# Patient Record
Sex: MALE | Race: OTHER | Employment: OTHER | URBAN - METROPOLITAN AREA
[De-identification: names, ages, dates, MRNs, and addresses within clinical notes are randomized per-mention and may not be internally consistent; named-entity substitution may affect disease eponyms.]

---

## 2017-04-25 ENCOUNTER — OFFICE VISIT (OUTPATIENT)
Dept: FAMILY MEDICINE CLINIC | Facility: CLINIC | Age: 62
End: 2017-04-25

## 2017-04-25 VITALS
DIASTOLIC BLOOD PRESSURE: 63 MMHG | SYSTOLIC BLOOD PRESSURE: 103 MMHG | TEMPERATURE: 98 F | BODY MASS INDEX: 26.98 KG/M2 | HEIGHT: 64 IN | HEART RATE: 64 BPM | WEIGHT: 158 LBS

## 2017-04-25 DIAGNOSIS — R21 RASH AND NONSPECIFIC SKIN ERUPTION: Primary | ICD-10-CM

## 2017-04-25 PROCEDURE — 99213 OFFICE O/P EST LOW 20 MIN: CPT | Performed by: FAMILY MEDICINE

## 2017-04-25 PROCEDURE — 99212 OFFICE O/P EST SF 10 MIN: CPT | Performed by: FAMILY MEDICINE

## 2017-04-25 RX ORDER — CEPHALEXIN 500 MG/1
500 CAPSULE ORAL 3 TIMES DAILY
Qty: 21 CAPSULE | Refills: 0 | Status: SHIPPED | OUTPATIENT
Start: 2017-04-25 | End: 2017-05-02

## 2017-04-25 NOTE — PROGRESS NOTES
HPI:    Patient ID: Gui Wilson is a [address] old male. Rash  This is a new problem. Episode onset: 2 weeks. The problem has been gradually worsening since onset. The affected locations include the left lower leg.  The rash is characterized by itchiness

## 2017-11-06 ENCOUNTER — OFFICE VISIT (OUTPATIENT)
Dept: FAMILY MEDICINE CLINIC | Facility: CLINIC | Age: 62
End: 2017-11-06

## 2017-11-06 VITALS
BODY MASS INDEX: 26.63 KG/M2 | HEIGHT: 64 IN | WEIGHT: 156 LBS | SYSTOLIC BLOOD PRESSURE: 123 MMHG | TEMPERATURE: 98 F | HEART RATE: 69 BPM | DIASTOLIC BLOOD PRESSURE: 83 MMHG

## 2017-11-06 DIAGNOSIS — L03.113 CELLULITIS OF HAND, RIGHT: ICD-10-CM

## 2017-11-06 DIAGNOSIS — L30.8 OTHER ECZEMA: Primary | ICD-10-CM

## 2017-11-06 PROCEDURE — 99214 OFFICE O/P EST MOD 30 MIN: CPT | Performed by: FAMILY MEDICINE

## 2017-11-06 PROCEDURE — 99212 OFFICE O/P EST SF 10 MIN: CPT | Performed by: FAMILY MEDICINE

## 2017-11-06 RX ORDER — PREDNISONE 20 MG/1
20 TABLET ORAL DAILY
Qty: 7 TABLET | Refills: 0 | Status: SHIPPED | OUTPATIENT
Start: 2017-11-06 | End: 2017-11-13

## 2017-11-06 RX ORDER — CEPHALEXIN 500 MG/1
500 CAPSULE ORAL 3 TIMES DAILY
Qty: 30 CAPSULE | Refills: 0 | Status: SHIPPED | OUTPATIENT
Start: 2017-11-06 | End: 2017-11-16

## 2017-11-16 ENCOUNTER — OFFICE VISIT (OUTPATIENT)
Dept: FAMILY MEDICINE CLINIC | Facility: CLINIC | Age: 62
End: 2017-11-16

## 2017-11-16 VITALS
BODY MASS INDEX: 26.98 KG/M2 | HEART RATE: 71 BPM | WEIGHT: 158 LBS | HEIGHT: 64 IN | DIASTOLIC BLOOD PRESSURE: 84 MMHG | SYSTOLIC BLOOD PRESSURE: 128 MMHG

## 2017-11-16 DIAGNOSIS — L03.113 CELLULITIS OF RIGHT UPPER EXTREMITY: Primary | ICD-10-CM

## 2017-11-16 DIAGNOSIS — Z00.00 WELL ADULT EXAM: ICD-10-CM

## 2017-11-16 DIAGNOSIS — Z12.11 SCREEN FOR COLON CANCER: ICD-10-CM

## 2017-11-16 PROCEDURE — 99213 OFFICE O/P EST LOW 20 MIN: CPT | Performed by: NURSE PRACTITIONER

## 2017-11-16 NOTE — PATIENT INSTRUCTIONS
Triple antibiotic ointment to hand-keep covered when working; call if increase in redness, pain or drainage      LAB PREPARATION    -nothing to eat or drink for 12 hours prior to lab testing being done, except water  -please drink at least one large glass

## 2017-11-16 NOTE — PROGRESS NOTES
HPI    Pt here for f/u cellulitis dorsum right hand. Pt states hand is much improved -now just itchy and scab is cracking. Is due for routine colonoscopy, blood work. Declines flu shot. Review of Systems   Constitutional: Negative for fever.    Skin: Po Pulmonary/Chest: Effort normal. No respiratory distress. Musculoskeletal: Normal range of motion. Neurological: He is alert and oriented to person, place, and time. Skin: Skin is warm and dry. Psychiatric: He has a normal mood and affect.  His

## 2017-11-17 ENCOUNTER — APPOINTMENT (OUTPATIENT)
Dept: LAB | Age: 62
End: 2017-11-17
Attending: NURSE PRACTITIONER
Payer: MEDICAID

## 2017-11-17 DIAGNOSIS — Z00.00 WELL ADULT EXAM: ICD-10-CM

## 2017-11-17 PROCEDURE — 80053 COMPREHEN METABOLIC PANEL: CPT

## 2017-11-17 PROCEDURE — 81001 URINALYSIS AUTO W/SCOPE: CPT

## 2017-11-17 PROCEDURE — 83036 HEMOGLOBIN GLYCOSYLATED A1C: CPT

## 2017-11-17 PROCEDURE — 84443 ASSAY THYROID STIM HORMONE: CPT

## 2017-11-17 PROCEDURE — 85027 COMPLETE CBC AUTOMATED: CPT

## 2017-11-17 PROCEDURE — 82306 VITAMIN D 25 HYDROXY: CPT

## 2017-11-17 PROCEDURE — 80061 LIPID PANEL: CPT

## 2017-11-17 PROCEDURE — 82607 VITAMIN B-12: CPT

## 2017-11-17 PROCEDURE — 36415 COLL VENOUS BLD VENIPUNCTURE: CPT

## 2017-11-18 DIAGNOSIS — E78.00 HYPERCHOLESTEROLEMIA: Primary | ICD-10-CM

## 2017-11-18 DIAGNOSIS — R97.20 ELEVATED PSA: ICD-10-CM

## 2017-11-20 ENCOUNTER — TELEPHONE (OUTPATIENT)
Dept: OTHER | Age: 62
End: 2017-11-20

## 2017-11-21 NOTE — TELEPHONE ENCOUNTER
----- Message from ESTEVAN Carlton FNP-C sent at 11/18/2017  9:00 AM CST -----  Vitamin D levels are within normal limits. Your hga1c is normal-no signs of developing diabetes at this time.  Your psa is slightly elevated and higher that last year-I

## 2017-11-21 NOTE — TELEPHONE ENCOUNTER
Tony Carmona Pt was inform of your message below. Pt stated that he will like to try diet and exercise first before starting a chol medication. He will like for you to give him 3 months. If it comes out high then he will start the chol medication.

## 2017-12-11 ENCOUNTER — OFFICE VISIT (OUTPATIENT)
Dept: FAMILY MEDICINE CLINIC | Facility: CLINIC | Age: 62
End: 2017-12-11

## 2017-12-11 VITALS
TEMPERATURE: 98 F | DIASTOLIC BLOOD PRESSURE: 66 MMHG | HEART RATE: 70 BPM | HEIGHT: 64 IN | SYSTOLIC BLOOD PRESSURE: 107 MMHG | BODY MASS INDEX: 26.8 KG/M2 | WEIGHT: 157 LBS

## 2017-12-11 DIAGNOSIS — E78.00 HYPERCHOLESTEREMIA: ICD-10-CM

## 2017-12-11 DIAGNOSIS — R21 RASH OF HANDS: Primary | ICD-10-CM

## 2017-12-11 DIAGNOSIS — R94.6 BORDERLINE ABNORMAL THYROID FUNCTION TEST: ICD-10-CM

## 2017-12-11 DIAGNOSIS — R97.20 ELEVATED PSA: ICD-10-CM

## 2017-12-11 PROCEDURE — 99214 OFFICE O/P EST MOD 30 MIN: CPT | Performed by: FAMILY MEDICINE

## 2017-12-11 PROCEDURE — 99212 OFFICE O/P EST SF 10 MIN: CPT | Performed by: FAMILY MEDICINE

## 2017-12-11 NOTE — PATIENT INSTRUCTIONS
Controlling Your Cholesterol  Cholesterol is a waxy substance. It travels in your blood through the blood vessels. When you have high cholesterol, it can build up along the walls of the blood vessels.  This makes the vessels narrower and decreases blood f · Choose an activity you enjoy. Walking, swimming, and riding a bike are some good ways to be active. · Start at a level where you feel comfortable. Increase your time and pace a little each week.   · Work up to 30 to 40 minutes of moderate to high intensi · Cutting back on the amount of fat and cholesterol in your meals  · Eating less salt (sodium). This is especially important if you have high blood pressure.   · Eating more fresh vegetables and fruits  · Eating lean proteins such as fish, poultry, beans, a Smoking and other tobacco use can raise cholesterol and make it harder to control. Quitting is tough. But millions of people have given up tobacco for good. You can quit, too! Think about some of the reasons below to quit smoking.  Do any of them make you t Some people may need to take medicines called statins to control their cholesterol. This is in addition to eating a healthy diet and getting regular exercise. Statins can help you stay healthy. They can also help prevent a heart attack or stroke.  You may Your body needs cholesterol to build new cells and create certain hormones. There are 2 kinds of cholesterol in your blood:     · HDL (“good”) cholesterol. This prevents fat deposits (plaque) from building up in your arteries.  In this way it protects again Create a diet high in good fats, low in bad fats (and low in cholesterol)  The following steps will help you create a diet high in good fats and low in bad fats:  · Talk with your doctor before starting a low cholesterol diet or weight loss program.  · Mu Putnam

## 2017-12-11 NOTE — PROGRESS NOTES
HPI:    Patient ID: Namita Jolly is a 63year old male. HPI     Patient with complains of a rash in both hands. He was prescribed triamcinolone for this rash before.   He states it slightly gets better but then continues to get scaly and dry and someti thyroid function test  RECHECK TSH 3 MONTHS  - TSH W REFLEX TO FREE T4; Future      Orders Placed This Encounter      Lipid Panel [E]      TSH W Reflex To Free T4 [E]      AST (SGOT) [E]      ALT(SGPT) [E]    Meds This Visit:  No prescriptions requested or

## 2017-12-20 ENCOUNTER — OFFICE VISIT (OUTPATIENT)
Dept: SURGERY | Facility: CLINIC | Age: 62
End: 2017-12-20

## 2017-12-20 VITALS
HEIGHT: 65 IN | DIASTOLIC BLOOD PRESSURE: 71 MMHG | TEMPERATURE: 98 F | BODY MASS INDEX: 26.33 KG/M2 | SYSTOLIC BLOOD PRESSURE: 118 MMHG | HEART RATE: 88 BPM | WEIGHT: 158 LBS

## 2017-12-20 DIAGNOSIS — R97.20 ELEVATED PSA: Primary | ICD-10-CM

## 2017-12-20 PROCEDURE — 99244 OFF/OP CNSLTJ NEW/EST MOD 40: CPT | Performed by: UROLOGY

## 2017-12-20 PROCEDURE — 99212 OFFICE O/P EST SF 10 MIN: CPT | Performed by: UROLOGY

## 2017-12-20 RX ORDER — CIPROFLOXACIN 500 MG/1
500 TABLET, FILM COATED ORAL 2 TIMES DAILY
Qty: 6 TABLET | Refills: 0 | Status: SHIPPED | OUTPATIENT
Start: 2017-12-20 | End: 2017-12-26

## 2017-12-20 RX ORDER — CEFDINIR 300 MG/1
300 CAPSULE ORAL EVERY 12 HOURS
Qty: 6 CAPSULE | Refills: 0 | Status: SHIPPED | OUTPATIENT
Start: 2017-12-20 | End: 2017-12-23

## 2017-12-20 NOTE — PROGRESS NOTES
[facility] Patient Status:  Outpatient    1955 MRN [id number]   Location [address] [facility] Attending Casey De La Cruz.  [address] Day # 0 PCP Fanta Avery MD       [facility] prostate. The patient denies urological workup in the past including biopsy of the prostate, however PSA blood testing has been done as discussed above no, intravenous pyelogram, ultrasound or CAT scanning.   No history of venereal disease, stricture of th Outpatient Prescriptions:   •  triamcinolone acetonide 0.1 % External Cream, Apply topically 2 (two) times daily as needed. , Disp: 60 g, Rfl: 0    ALLERGIES:   No Known Allergies    SOCIAL HISTORY:    Non-smoker never smoked he denies alcohol or drug use o consistency. Epididymis, vas and cord structures are intact. There are no hernias bilaterally. Rectal exam shows normal perineum, good rectal tone, no rectal masses. Prostate is   1.5+ in size, firm, symmetrical, non-nodular, nontender.   Seminal vesi 1-2 days but quickly resolves. Bleeding in the ejaculate can be seen 1 to 2 months after the procedure.   I did discuss with patient that he has 2 pre-existing risk factors that include family history and elevated PSA which would actually put patient's pr

## 2017-12-29 ENCOUNTER — TELEPHONE (OUTPATIENT)
Dept: SURGERY | Facility: CLINIC | Age: 62
End: 2017-12-29

## 2017-12-29 NOTE — TELEPHONE ENCOUNTER
Upgrade pharm states Cefdinir is not covered by patient insurance. Prior Rebbeca Eduarda will be needed or another script with antibiotic that insurance accepts. Please call.  Thank you

## 2018-01-02 ENCOUNTER — OFFICE VISIT (OUTPATIENT)
Dept: FAMILY MEDICINE CLINIC | Facility: CLINIC | Age: 63
End: 2018-01-02

## 2018-01-02 ENCOUNTER — TELEPHONE (OUTPATIENT)
Dept: DERMATOLOGY CLINIC | Facility: CLINIC | Age: 63
End: 2018-01-02

## 2018-01-02 VITALS
BODY MASS INDEX: 26.33 KG/M2 | SYSTOLIC BLOOD PRESSURE: 126 MMHG | HEIGHT: 65 IN | WEIGHT: 158 LBS | DIASTOLIC BLOOD PRESSURE: 75 MMHG | HEART RATE: 89 BPM

## 2018-01-02 DIAGNOSIS — R21 RASH AND NONSPECIFIC SKIN ERUPTION: Primary | ICD-10-CM

## 2018-01-02 PROCEDURE — 99212 OFFICE O/P EST SF 10 MIN: CPT | Performed by: FAMILY MEDICINE

## 2018-01-02 PROCEDURE — 99213 OFFICE O/P EST LOW 20 MIN: CPT | Performed by: FAMILY MEDICINE

## 2018-01-02 NOTE — TELEPHONE ENCOUNTER
Call received from Dr. Roc Marin office - pt had a visit scheduled with Raheem Martinez 1/8, but Dr. Thania Montgomery wants pt to be seen this week for a rash to right hand. Rash is itchy, \"pt unable to sleep at night\". Hx of cellulitis also.  Pt on multiple steroids and antibiotic

## 2018-01-02 NOTE — PROGRESS NOTES
HPI:    Patient ID: Floridalma Torres is a 63year old male. HPI     Patient here for follow up rash  Getting worse mynor right hand. Has had a smal patch recurring for years on left lower leg, rash was recurring every year lasting for about a week.   He no [id number]

## 2018-01-03 ENCOUNTER — TELEPHONE (OUTPATIENT)
Dept: SURGERY | Facility: CLINIC | Age: 63
End: 2018-01-03

## 2018-01-03 NOTE — TELEPHONE ENCOUNTER
Please contact patient and we do want him to take the Ceftin or preop for prostate biopsies I apologize if it is not covered but it is only 6 tablets

## 2018-01-03 NOTE — TELEPHONE ENCOUNTER
Called and informed patient that Dr. Misael Adams prefers patient take Cefdinir instead of alternate antibiotic for prostate biopsy. Informed patient that Rx is for only 6 tablets. Patient asked what the cost of medication would be.  Informed patient to call pharm

## 2018-01-03 NOTE — TELEPHONE ENCOUNTER
Pt is at the pharm now to  rx. Ciprofloxacin. Pharm has not received. Please send. Pt is waiting for the rx.

## 2018-01-04 ENCOUNTER — OFFICE VISIT (OUTPATIENT)
Dept: DERMATOLOGY CLINIC | Facility: CLINIC | Age: 63
End: 2018-01-04

## 2018-01-04 DIAGNOSIS — L30.9 DERMATITIS: Primary | ICD-10-CM

## 2018-01-04 PROCEDURE — 99213 OFFICE O/P EST LOW 20 MIN: CPT | Performed by: DERMATOLOGY

## 2018-01-04 PROCEDURE — 99202 OFFICE O/P NEW SF 15 MIN: CPT | Performed by: DERMATOLOGY

## 2018-01-04 PROCEDURE — 96372 THER/PROPH/DIAG INJ SC/IM: CPT | Performed by: DERMATOLOGY

## 2018-01-04 RX ORDER — CEPHALEXIN 500 MG/1
500 CAPSULE ORAL 2 TIMES DAILY
Qty: 14 CAPSULE | Refills: 0 | Status: SHIPPED | OUTPATIENT
Start: 2018-01-04 | End: 2018-05-29

## 2018-01-04 RX ORDER — BETAMETHASONE DIPROPIONATE 0.5 MG/G
1 CREAM TOPICAL DAILY PRN
Qty: 50 G | Refills: 3 | Status: SHIPPED | OUTPATIENT
Start: 2018-01-04 | End: 2018-06-04

## 2018-01-04 RX ORDER — TRIAMCINOLONE ACETONIDE 40 MG/ML
40 INJECTION, SUSPENSION INTRA-ARTICULAR; INTRAMUSCULAR ONCE
Status: COMPLETED | OUTPATIENT
Start: 2018-01-04 | End: 2018-01-04

## 2018-01-04 RX ADMIN — TRIAMCINOLONE ACETONIDE 40 MG: 40 INJECTION, SUSPENSION INTRA-ARTICULAR; INTRAMUSCULAR at 15:09:00

## 2018-01-04 NOTE — TELEPHONE ENCOUNTER
Spoke with Blake Duenas, who checked for the ciprofloxacin order. She states it is there, and she apologized it was overlooked, and she will fill it and call the pt to inform. The Cefdinir script has been filled and waiting to be picked up.

## 2018-01-15 NOTE — PROGRESS NOTES
Ceci Trevino is a 63year old male. Patient presents with:  Rash: New pt presents with rash to right hand and left leg x2 months. Referred by . Hx of cellulitis.  rx'd Abx, prednisone, and triamcinolone.  Pt states that the PO antibiotic History Main Topics   Smoking status: Never Smoker    Smokeless tobacco: Never Used    Alcohol use No    Comment:     Drug use: No    Sexual activity: Not on file     Other Topics Concern    Pt has a pacemaker No    Pt has a defibrillator No    Reaction to generalized eczematous patches    Exam otherwise significant for multiple excoriations  Multiple light to medium brown, well marginated, uniformly pigmented, macules and papules 6 mm and less over the back, chest, abdomen, bilateral arms, neck benign-appea triamcinolone acetonide (KENALOG-40) 40 MG/ML injection 40 mg     Admin Date  01/04/2018 Action  Given Dose  40 mg Route  Intramuscular Administered By  Aly Juan, RN                Imaging Orders:  None     Referral Orders:  No orders of the defined

## 2018-02-12 ENCOUNTER — TELEPHONE (OUTPATIENT)
Dept: SURGERY | Facility: CLINIC | Age: 63
End: 2018-02-12

## 2018-02-12 NOTE — TELEPHONE ENCOUNTER
I spoke with pt and answered his question about whether or not he will be awake for the prostate bx procedure tomorrow. I also reviewed all of the other prep instructions and he verbalized understanding and will comply.

## 2018-02-13 ENCOUNTER — OFFICE VISIT (OUTPATIENT)
Dept: SURGERY | Facility: CLINIC | Age: 63
End: 2018-02-13

## 2018-02-13 VITALS
RESPIRATION RATE: 16 BRPM | HEART RATE: 68 BPM | SYSTOLIC BLOOD PRESSURE: 112 MMHG | DIASTOLIC BLOOD PRESSURE: 74 MMHG | WEIGHT: 158 LBS | BODY MASS INDEX: 26.33 KG/M2 | HEIGHT: 65 IN | TEMPERATURE: 98 F

## 2018-02-13 DIAGNOSIS — R97.20 ELEVATED PSA: Primary | ICD-10-CM

## 2018-02-13 PROCEDURE — 76872 US TRANSRECTAL: CPT | Performed by: UROLOGY

## 2018-02-13 PROCEDURE — 55700 BIOPSY OF PROSTATE,NEEDLE/PUNCH: CPT | Performed by: UROLOGY

## 2018-02-13 RX ORDER — CEFDINIR 300 MG/1
CAPSULE ORAL
Refills: 0 | COMMUNITY
Start: 2018-01-04 | End: 2018-05-29

## 2018-02-13 RX ORDER — CIPROFLOXACIN 500 MG/1
TABLET, FILM COATED ORAL
Refills: 0 | COMMUNITY
Start: 2018-01-04 | End: 2018-05-29

## 2018-02-13 NOTE — PROGRESS NOTES
PREOPERATIVE DIAGNOSIS: Elevated PSA. PRINCIPAL PROCEDURE PERFORMED: Transrectal ultrasound of prostate with biopsies. DESCRIPTION OF PROCEDURE: The patient was given 20 cc’s of 2% Xylocaine jelly per rectum and this was maintained for 20 minutes. feet today, drink plenty of fluids today and return to normal activities the following morning.      FINDINGS: Seminal vesicles are well visualized, isoechoic in nature, symmetrical with normal fat plane between seminal vesicle and rectal wall, as well as b

## 2018-02-16 ENCOUNTER — TELEPHONE (OUTPATIENT)
Dept: SURGERY | Facility: CLINIC | Age: 63
End: 2018-02-16

## 2018-02-16 NOTE — TELEPHONE ENCOUNTER
----- Message from Joshua Tracy MD sent at 2/16/2018 11:14 AM CST -----  I did call patient personally with pathology report that was positive

## 2018-02-20 ENCOUNTER — TELEPHONE (OUTPATIENT)
Dept: SURGERY | Facility: CLINIC | Age: 63
End: 2018-02-20

## 2018-02-20 NOTE — TELEPHONE ENCOUNTER
I called pt into and exam room and introduced myself. I determined that pt had intercourse last night and his semen was very bloody. I also determined that pt had prostate biopsies here in the office on 2/13.   I explained that he should have been given ins

## 2018-02-20 NOTE — TELEPHONE ENCOUNTER
Pt  Came to the  to ask a nurse a question. Pt stated that his semen was full of blood yesterday, Pt  also  stated he had a biospy last  Week.  Please  advise

## 2018-02-27 ENCOUNTER — OFFICE VISIT (OUTPATIENT)
Dept: SURGERY | Facility: CLINIC | Age: 63
End: 2018-02-27

## 2018-02-27 VITALS
SYSTOLIC BLOOD PRESSURE: 132 MMHG | DIASTOLIC BLOOD PRESSURE: 70 MMHG | BODY MASS INDEX: 26 KG/M2 | TEMPERATURE: 98 F | WEIGHT: 158 LBS

## 2018-02-27 DIAGNOSIS — C61 PROSTATE CANCER (HCC): Primary | ICD-10-CM

## 2018-02-27 PROCEDURE — 99214 OFFICE O/P EST MOD 30 MIN: CPT | Performed by: UROLOGY

## 2018-02-27 PROCEDURE — 99213 OFFICE O/P EST LOW 20 MIN: CPT | Performed by: UROLOGY

## 2018-02-27 RX ORDER — BICALUTAMIDE 50 MG/1
50 TABLET, FILM COATED ORAL NIGHTLY
Qty: 30 TABLET | Refills: 11 | Status: SHIPPED | OUTPATIENT
Start: 2018-02-27 | End: 2018-03-29

## 2018-02-27 RX ORDER — IBUPROFEN 600 MG/1
600 TABLET ORAL EVERY 6 HOURS PRN
COMMUNITY
End: 2018-08-07

## 2018-02-27 NOTE — PROGRESS NOTES
[facility] Patient Status:  Outpatient    1955 MRN [id number]   Location [address] Attending Shante Smith. [address] Day # 0 PCP Fanta Herman MD       Heidy Ewing is a 63year old 02/27/18  0843   BP: 132/70   BP Location: Right arm   Patient Position: Sitting   Cuff Size: adult   Temp: 98.1 °F (36.7 °C)   TempSrc: Oral   Weight: 158 lb (71.7 kg)          PHYSICAL EXAM: December 20, 2017 with a mildly enlarged nonnodular prostate an other biopsies benign. I discussed several aspects of prostate cancer care including slow is growing cancer that there is effective forms of therapy and any greater any stage and multiple options available to patient as far as treatment is concerned.   We

## 2018-02-27 NOTE — PROGRESS NOTES
Patient's name and  verified, Eligard 22.5 mg sub q injection given,left upper abdomen, no reaction noted, patient tolerated injection well. ISR information sheet given to patient.      Sarah Baer 47 # [id number]

## 2018-03-01 PROCEDURE — 96402 CHEMO HORMON ANTINEOPL SQ/IM: CPT | Performed by: UROLOGY

## 2018-03-09 ENCOUNTER — TELEPHONE (OUTPATIENT)
Dept: SURGERY | Facility: CLINIC | Age: 63
End: 2018-03-09

## 2018-03-09 NOTE — TELEPHONE ENCOUNTER
Pt states that [facility] does not accept his insurance. Pt would like to know if [facility] can refer him somewhere else. Please advise. Thank you.

## 2018-03-13 NOTE — TELEPHONE ENCOUNTER
I do not have anywhere else to send him for information and plan of seed implant.   Maybe I can talk to Dr. David Hyde specifically

## 2018-03-14 ENCOUNTER — TELEPHONE (OUTPATIENT)
Dept: SURGERY | Facility: CLINIC | Age: 63
End: 2018-03-14

## 2018-03-14 NOTE — TELEPHONE ENCOUNTER
Kimberlyn Mack from Dr. Jaya Brizuela office called requesting recent PSA and office notes, please Fax to [phone]. Thank you.

## 2018-03-14 NOTE — TELEPHONE ENCOUNTER
, I have placed your lov note as well as this t.e. With [facility] tel #. [phone], on your desk. Thank you.

## 2018-03-14 NOTE — TELEPHONE ENCOUNTER
Please contact patient that I talked to Dr. Michel Gipson specifically and Dr. Michel Gipson is willing to see him there may be additional charges for seeds I am told but it should be manageable.   So patient can call back [address] and Dr. Michel Gipson said

## 2018-03-14 NOTE — TELEPHONE ENCOUNTER
asked that I call [facility], as nurse is waiting for call with pt's tel #. I did this and spoke with nurse James Trujillo. Informed her of 's message below, and she states she will provided the information to the pt. She is thankful.  Provided her wit

## 2018-05-29 ENCOUNTER — OFFICE VISIT (OUTPATIENT)
Dept: SURGERY | Facility: CLINIC | Age: 63
End: 2018-05-29

## 2018-05-29 VITALS
DIASTOLIC BLOOD PRESSURE: 85 MMHG | SYSTOLIC BLOOD PRESSURE: 142 MMHG | HEART RATE: 65 BPM | WEIGHT: 150 LBS | HEIGHT: 65 IN | TEMPERATURE: 98 F | BODY MASS INDEX: 24.99 KG/M2 | RESPIRATION RATE: 16 BRPM

## 2018-05-29 DIAGNOSIS — C61 PROSTATE CANCER (HCC): Primary | ICD-10-CM

## 2018-05-29 PROCEDURE — 99214 OFFICE O/P EST MOD 30 MIN: CPT | Performed by: UROLOGY

## 2018-05-29 PROCEDURE — 99213 OFFICE O/P EST LOW 20 MIN: CPT | Performed by: UROLOGY

## 2018-05-29 PROCEDURE — 96402 CHEMO HORMON ANTINEOPL SQ/IM: CPT | Performed by: UROLOGY

## 2018-05-29 RX ORDER — BICALUTAMIDE 50 MG/1
TABLET, FILM COATED ORAL
COMMUNITY
Start: 2018-04-27 | End: 2018-08-07

## 2018-05-29 NOTE — PROGRESS NOTES
[facility] Patient Status:  Outpatient    1955 MRN [id number]   Location [address] Attending Segundo Seaman.  [address] Day # 0 PCP Fanta Jean-Baptiste MD       Ceci Trevino is a 63year old °C)   TempSrc: Oral   Weight: 150 lb (68 kg)   Height: 5' 5\" (1.651 m)          PHYSICAL EXAM: Besides vital signs physical exam not performed today it was just done at recent consult and ultrasound biopsies February 13, 2018.   PSA blood tests May 2016 no 27, 2018 patient was seen at [facility] there was some controversy over patient's insurance covering this but I talked to Dr. Maria De Jesus Padron personally who stated that he would be covered for implant if patient decides to do that he has decided

## 2018-06-04 RX ORDER — BETAMETHASONE DIPROPIONATE 0.5 MG/G
1 CREAM TOPICAL DAILY PRN
Qty: 50 G | Refills: 1 | Status: SHIPPED | OUTPATIENT
Start: 2018-06-04 | End: 2018-06-06

## 2018-06-04 NOTE — TELEPHONE ENCOUNTER
LOV 05/29/18 pt requesting refill for BETAMETHASONE DIPROPIONATE AUG 0.05 % External Cream and TRIAMCINOLONE ACETONIDE 0.1 % External Ointment please advise thank you

## 2018-06-05 NOTE — TELEPHONE ENCOUNTER
Insurance will only cover 60grams of Triamcinolone. Betamethasone insurance will only cover 45grams. New rx's need to be sent over with correct dosage.  Please call

## 2018-06-05 NOTE — TELEPHONE ENCOUNTER
Spoke with pt letting him know prescriptions were called into pharmacy and advised pt to give the office a call back to schedule an f/u appt per Raheem Paniagua.

## 2018-06-06 RX ORDER — BETAMETHASONE DIPROPIONATE 0.5 MG/G
1 CREAM TOPICAL DAILY PRN
Qty: 45 G | Refills: 1 | Status: SHIPPED | OUTPATIENT
Start: 2018-06-06 | End: 2018-07-06

## 2018-06-06 NOTE — TELEPHONE ENCOUNTER
Pharmacy contacted and confirmed sizes pts insurance will cover. New sizes sent.  Sent to [facility] as [place]

## 2018-06-29 NOTE — TELEPHONE ENCOUNTER
LOV 01/04/18 pt requesting refill for TRIAMCINOLONE ACETONIDE 0.1 % External Ointment please advise thank you

## 2018-07-06 ENCOUNTER — OFFICE VISIT (OUTPATIENT)
Dept: DERMATOLOGY CLINIC | Facility: CLINIC | Age: 63
End: 2018-07-06

## 2018-07-06 DIAGNOSIS — L30.9 DERMATITIS: Primary | ICD-10-CM

## 2018-07-06 PROCEDURE — 99212 OFFICE O/P EST SF 10 MIN: CPT | Performed by: DERMATOLOGY

## 2018-07-06 PROCEDURE — 99213 OFFICE O/P EST LOW 20 MIN: CPT | Performed by: DERMATOLOGY

## 2018-07-06 RX ORDER — BETAMETHASONE DIPROPIONATE 0.5 MG/G
1 CREAM TOPICAL DAILY PRN
Qty: 45 G | Refills: 4 | Status: SHIPPED | OUTPATIENT
Start: 2018-07-06 | End: 2018-08-05

## 2018-07-06 RX ORDER — HYDROCODONE BITARTRATE AND ACETAMINOPHEN 5; 325 MG/1; MG/1
TABLET ORAL
COMMUNITY
Start: 2012-07-19 | End: 2018-08-07

## 2018-07-06 RX ORDER — NAPROXEN 375 MG/1
375 TABLET, DELAYED RELEASE ORAL
COMMUNITY
Start: 2012-07-19 | End: 2018-08-07

## 2018-07-06 RX ORDER — PIMECROLIMUS 10 MG/G
1 CREAM TOPICAL 2 TIMES DAILY
Qty: 100 G | Refills: 6 | Status: SHIPPED | OUTPATIENT
Start: 2018-07-06 | End: 2019-06-19 | Stop reason: ALTCHOICE

## 2018-07-06 RX ORDER — AMOXICILLIN 500 MG/1
CAPSULE ORAL
Refills: 0 | COMMUNITY
Start: 2018-06-05 | End: 2018-08-07 | Stop reason: ALTCHOICE

## 2018-07-07 ENCOUNTER — TELEPHONE (OUTPATIENT)
Dept: DERMATOLOGY CLINIC | Facility: CLINIC | Age: 63
End: 2018-07-07

## 2018-07-13 ENCOUNTER — TELEPHONE (OUTPATIENT)
Dept: SURGERY | Facility: CLINIC | Age: 63
End: 2018-07-13

## 2018-07-13 NOTE — TELEPHONE ENCOUNTER
Pt at  inquiring about labs he had done that [facility] had ordered/    Pt states he goes to [facility]/ Pt called [facility] and they will not give him his results/    Pt states no one from our office has called him to give him his results and he is very co

## 2018-07-16 NOTE — PROGRESS NOTES
Lay Day is a 66year old male. Patient presents with:  Dermatitis: LOV 11/3/2014. Pt presenting for f/u with dermatitis to R hand and L lower leg. c/o itching. Pt using betamethasone and triamcinolone for treatment.              Patient has no kn Rfl: 6   TRIAMCINOLONE ACETONIDE 0.1 % External Ointment Apply 1 Application topically 2 (two) times daily.  Disp: 60 g Rfl: 1   bicalutamide 50 MG Oral Tab  Disp:  Rfl:    ibuprofen 600 MG Oral Tab Take 600 mg by mouth every 6 (six) hours as needed for [employer] noted.      Nothing new or different no unusual exposures. No changes in soaps, detergents, skin care products. No recent travel. No else at home itching. No recent illnesses. ROS:   Denies any other systemic complaints.   No fevers, chills, night No suspicious pigmented lesions reassurance given regarding keratoses benign nevi. RTC as noted one month if not improving    No orders of the defined types were placed in this encounter.       Meds & Refills for this Visit:   Signed Prescriptions Disp

## 2018-07-16 NOTE — TELEPHONE ENCOUNTER
I called [facility] and spoke with rep. Lorene Ramsey and I gave her pt's name ,  and demographics and the date of the lab order of 18 and she informed that she does not have any lab results at all for this pt in the last 90 days.    I called pt back to inform

## 2018-07-17 NOTE — TELEPHONE ENCOUNTER
I spoke with pt and he informed that he had the PSA blood test drawn at [facility] at [address] [place] [address]. [facility] in [address] [place] [address] and that their ph #is [phone], and the fx # is [phone].  I explained that I will try to get them to fax that PSA test result and I w

## 2018-07-18 NOTE — TELEPHONE ENCOUNTER
I called [facility] again and spoke with [facility]TON in the lab and asked if she could fax pt's PSA results done on 5/29 or 5/30 and she was able to locate that result and agreed to fax it. I will wait for the fax.

## 2018-07-18 NOTE — TELEPHONE ENCOUNTER
I called Rosalind Rubin again at [address] [place] [address] and she stated that she did fax the PSA results and she verified the correct fax # and I told her that I never received it. She stated that she will fax it again.    I was not receiving the fax so I assessed our fax machine an

## 2018-07-19 NOTE — TELEPHONE ENCOUNTER
I spoke with BERNARDO and he stated that he does not want to review results without seeing the pt in the office. I explained that pt was anxious for advice on the results but does not have an appt with Dr. Mechelle Boyd till 9/10.  BERNARDO stated that I can tell the pt t

## 2018-07-23 NOTE — TELEPHONE ENCOUNTER
S/w pt, [facility] is an option in this situation - cost should be 35$-75$ - pt will think about it and CB.

## 2018-08-07 ENCOUNTER — OFFICE VISIT (OUTPATIENT)
Dept: SURGERY | Facility: CLINIC | Age: 63
End: 2018-08-07
Payer: COMMERCIAL

## 2018-08-07 VITALS
SYSTOLIC BLOOD PRESSURE: 120 MMHG | TEMPERATURE: 99 F | HEIGHT: 65 IN | DIASTOLIC BLOOD PRESSURE: 80 MMHG | RESPIRATION RATE: 18 BRPM | WEIGHT: 150 LBS | HEART RATE: 80 BPM | BODY MASS INDEX: 24.99 KG/M2

## 2018-08-07 DIAGNOSIS — C61 CANCER OF PROSTATE WITH INTERMEDIATE RECURRENCE RISK (STAGE T2B-C OR GLEASON 7 OR PSA 10-20) (HCC): Primary | ICD-10-CM

## 2018-08-07 PROCEDURE — 99212 OFFICE O/P EST SF 10 MIN: CPT | Performed by: UROLOGY

## 2018-08-07 PROCEDURE — 99244 OFF/OP CNSLTJ NEW/EST MOD 40: CPT | Performed by: UROLOGY

## 2018-08-07 NOTE — PROGRESS NOTES
[facility]  Initial Office Consultation    HPI:   Raj Green is a 66year old male here today for establishing care after Dr. Bernardo Villagomez retired. Mr. Natalie Omer was found to have an elevated screening PSA of 4.5 ng/dL in November 2017.   He saw  Problem Relation Age of Onset   • Hypertension Father    • Diabetes Mother    • Diabetes Maternal Grandmother    • Cancer Neg    • Heart Disorder Neg    • Lipids Neg      Smoking status: Never Smoker normal and breath sounds normal.   Abdominal: Soft. He exhibits no distension and no mass. There is no tenderness. No hernia. Hernia confirmed negative in the right inguinal area and confirmed negative in the left inguinal area.    Genitourinary: Rectum nor choice of treatment appropriate to their risk assessment. I reviewed the primary modes of treatment for prostate cancer:  observation (Active Surveillance vs. watchful waiting), radiation therapy, radical prostatectomy and hormonal therapy.   Regarding o specific recommendations for radiation therapy based on the specifics of his diagnosis.     Regarding surgery, I explained the primary operations that I primarily perform, robotic-assisted laparoscopic radical prostatectomy, and compared it with the alterna discussed it is primarily used as systemic therapy for patients with advanced or metastatic disease, but that it also has been shown to be beneficial in conjunction with radiation therapy for certain categories of patients.   The patient understands that LUIS CARLOS TORRES appointment as soon as possible with the radiation oncologist.  He stated that he will call the office back in case he decides on surgery as management of his prostate cancer.     Umer Grimes MD  8/7/2018 4:46 PM

## 2018-08-30 RX ORDER — BETAMETHASONE DIPROPIONATE 0.5 MG/G
1 CREAM TOPICAL DAILY PRN
Qty: 45 G | Refills: 0 | Status: SHIPPED | OUTPATIENT
Start: 2018-08-30 | End: 2018-09-29

## 2018-08-30 NOTE — TELEPHONE ENCOUNTER
LOV: 7-6-18 requesting refill on BETAMETHASONE DIPROPIONATE AUG 0.05% External Cream. Please advise.

## 2019-02-28 ENCOUNTER — TELEPHONE (OUTPATIENT)
Dept: SURGERY | Facility: CLINIC | Age: 64
End: 2019-02-28

## 2019-04-25 RX ORDER — BICALUTAMIDE 50 MG/1
50 TABLET, FILM COATED ORAL NIGHTLY
Qty: 30 TABLET | Refills: 11 | OUTPATIENT
Start: 2019-04-25 | End: 2019-05-25

## 2019-04-25 NOTE — TELEPHONE ENCOUNTER
Pt LOV with Dr. Amy Carmen 8/7/18 pt pharmacy requesting refill on bicalutamide I copied and pasted Dr. Galloway Noss last notes below. Per [facility] last notes pt is to stop med.     PLAN:  - Stop hormonal therapy (Bicalutamide and Eligard)

## 2019-06-17 ENCOUNTER — TELEPHONE (OUTPATIENT)
Dept: FAMILY MEDICINE CLINIC | Facility: CLINIC | Age: 64
End: 2019-06-17

## 2019-06-18 NOTE — ED NOTES
Made a rounds to this pt, pt seen  On cart awake ,alert. oriented, no signs of distress noted. pt on monitor.

## 2019-06-18 NOTE — ED PROVIDER NOTES
Patient Seen in: [facility] Emergency Department    History   Patient presents with:  Eval-D (detox)    Stated Complaint: Alcohol withdrawal, can't sleep    HPI    80-year-old male with past medical history significant for alcohol abuse presents t b/l  Nose: Nose normal.   Mouth/Throat: MMM, post OP clear with no exudates  Eyes: Conjunctivae and EOM are normal. PERRLA  Neck: Normal range of motion. Neck supple. No tracheal deviation present.    CV: s1s2+, RRR, no m/r/g, normal distal pulses  Pulmonar MDM   Patient is feeling better after 2 doses of 1 mg Ativan. His vital signs have normalized. I discussed findings with patient and he is comfortable with discharge on Librium.   He understands that he cannot drink alcohol while he is detoxing and

## 2019-06-18 NOTE — ED NOTES
Assumed care to this pt, received report from [facility] , per report given pt came in for Detox. Will do rounds after the report.

## 2019-06-18 NOTE — ED NOTES
Family to  patient. Patient given discharge instructions and prescriptions. Patient verbalized understanding. Ambulated out of ED with steady gait.

## 2019-06-18 NOTE — ED NOTES
Patient here stating he Krystle Springer been drinking too much lately\" and wants to stop. States he drinks about 12 beers a day, and if he doesn't he gets shaky. Denies SI/HI, just states he needs help.

## 2019-06-18 NOTE — ED INITIAL ASSESSMENT (HPI)
Pt presents with paranoid thoughts and difficulty sleeping. Sates hx of etoh abuse, last drank about an hour ago. States drinking \"three beers\" today. Denies drug use. Denies si/hi.

## 2019-06-18 NOTE — TELEPHONE ENCOUNTER
Paged 6:57pm  \"Unfortunately I have been drinking to much and now I can't eat or sleep for 1 week and I don't know what to do. \"  Pt shaky  \"I tried to stop and I was more shaky. \"    Pt was referred to the er. He said he would have someone take him.   O

## 2019-06-19 ENCOUNTER — HOSPITAL ENCOUNTER (EMERGENCY)
Facility: HOSPITAL | Age: 64
Discharge: HOME | End: 2019-06-19
Attending: EMERGENCY MEDICINE
Payer: MEDICAID

## 2019-06-19 ENCOUNTER — LAB ENCOUNTER (OUTPATIENT)
Dept: LAB | Age: 64
End: 2019-06-19
Attending: FAMILY MEDICINE
Payer: MEDICAID

## 2019-06-19 ENCOUNTER — TELEPHONE (OUTPATIENT)
Dept: OTHER | Age: 64
End: 2019-06-19

## 2019-06-19 VITALS
HEIGHT: 65 IN | BODY MASS INDEX: 26.66 KG/M2 | OXYGEN SATURATION: 94 % | TEMPERATURE: 98 F | DIASTOLIC BLOOD PRESSURE: 82 MMHG | WEIGHT: 160 LBS | HEART RATE: 89 BPM | SYSTOLIC BLOOD PRESSURE: 111 MMHG | RESPIRATION RATE: 21 BRPM

## 2019-06-19 DIAGNOSIS — E87.6 HYPOKALEMIA: Primary | ICD-10-CM

## 2019-06-19 DIAGNOSIS — D69.6 THROMBOCYTOPENIA (HCC): ICD-10-CM

## 2019-06-19 DIAGNOSIS — R74.8 ELEVATED LIVER ENZYMES: Primary | ICD-10-CM

## 2019-06-19 DIAGNOSIS — R74.8 ELEVATED LIVER ENZYMES: ICD-10-CM

## 2019-06-19 DIAGNOSIS — C61 CANCER OF PROSTATE WITH INTERMEDIATE RECURRENCE RISK (STAGE T2B-C OR GLEASON 7 OR PSA 10-20) (HCC): ICD-10-CM

## 2019-06-19 DIAGNOSIS — F10.282 ALCOHOL DEPENDENCE WITH ALCOHOL-INDUCED SLEEP DISORDER (HCC): ICD-10-CM

## 2019-06-19 PROCEDURE — 80076 HEPATIC FUNCTION PANEL: CPT

## 2019-06-19 PROCEDURE — 36415 COLL VENOUS BLD VENIPUNCTURE: CPT

## 2019-06-19 PROCEDURE — 83735 ASSAY OF MAGNESIUM: CPT

## 2019-06-19 PROCEDURE — 99284 EMERGENCY DEPT VISIT MOD MDM: CPT

## 2019-06-19 PROCEDURE — 96360 HYDRATION IV INFUSION INIT: CPT

## 2019-06-19 PROCEDURE — 93005 ELECTROCARDIOGRAM TRACING: CPT

## 2019-06-19 PROCEDURE — 84153 ASSAY OF PSA TOTAL: CPT

## 2019-06-19 PROCEDURE — 80048 BASIC METABOLIC PNL TOTAL CA: CPT

## 2019-06-19 PROCEDURE — 84439 ASSAY OF FREE THYROXINE: CPT

## 2019-06-19 PROCEDURE — 82607 VITAMIN B-12: CPT

## 2019-06-19 PROCEDURE — 85025 COMPLETE CBC W/AUTO DIFF WBC: CPT

## 2019-06-19 PROCEDURE — 93010 ELECTROCARDIOGRAM REPORT: CPT | Performed by: EMERGENCY MEDICINE

## 2019-06-19 PROCEDURE — 84443 ASSAY THYROID STIM HORMONE: CPT

## 2019-06-19 PROCEDURE — 80048 BASIC METABOLIC PNL TOTAL CA: CPT | Performed by: EMERGENCY MEDICINE

## 2019-06-19 RX ORDER — POTASSIUM CHLORIDE 20 MEQ/1
40 TABLET, EXTENDED RELEASE ORAL ONCE
Status: COMPLETED | OUTPATIENT
Start: 2019-06-19 | End: 2019-06-19

## 2019-06-19 NOTE — TELEPHONE ENCOUNTER
Per Dr. Symone Bradshaw call pt and instruct him to go to the ED. Called pt no answer. Called son Jian Wilhelm and informed Dr. Lilli Rico instructions. States he will call his dad. Placed a f/u call to pt and pt states he did received Dr. Lilli Rico instructions.  Advised to got
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
## 2019-06-20 NOTE — ED NOTES
Patient presents to ED after his PCP's office called him and stated his Potasium was low and he needed to come here. Patient A&Ox4. No complaints at this time. Patient states he drinks alcohol, last drink on Monday.

## 2019-06-20 NOTE — ED PROVIDER NOTES
Patient Seen in: [facility] Emergency Department    History   Patient presents with:  Abnormal Result (metabolic, cardiac)    Stated Complaint: low potassium    HPI    66year old male with h/o etoh abuse who is sent by PCP for hypokalemia.  Pt sta pulses. No murmur heard. Pulmonary/Chest: Effort normal and breath sounds normal. No respiratory distress. He has no wheezes. Abdominal: Soft. He exhibits no distension. There is no tenderness. There is no guarding.    Musculoskeletal: Normal range of pm    Follow-up:  Jae Villavicencio MD  [address] [address]  [phone]    Schedule an appointment as soon as possible for a visit in 2 days          Medications Prescribed:  Discharge Medication List as of 6/19/2019 11:19 PM

## 2019-06-20 NOTE — ED NOTES
Pt provided with discharge instructions. Verbalized understanding for plan of care at home and follow up. All questions/concerns addressed prior to discharge. IV removed, catheter intact.  Pt ambulatory out of er with steady gait,

## 2019-06-20 NOTE — ED INITIAL ASSESSMENT (HPI)
Pt presents to ER because he was called to come to ER for low Potassium result that was done earlier today, pt states that he has been having weakness on his bilateral legs for a week, pt ambulatory to triage. pt A/O x 4.denies SOB , denies chest pain. pt wa

## 2019-06-24 ENCOUNTER — APPOINTMENT (OUTPATIENT)
Dept: LAB | Age: 64
End: 2019-06-24
Attending: FAMILY MEDICINE
Payer: MEDICAID

## 2019-06-24 DIAGNOSIS — E87.6 HYPOKALEMIA: ICD-10-CM

## 2019-06-24 DIAGNOSIS — E87.6 HYPOKALEMIA: Primary | ICD-10-CM

## 2019-06-24 PROCEDURE — 36415 COLL VENOUS BLD VENIPUNCTURE: CPT

## 2019-06-24 PROCEDURE — 80048 BASIC METABOLIC PNL TOTAL CA: CPT

## 2019-06-24 RX ORDER — POTASSIUM CHLORIDE 1500 MG/1
20 TABLET, FILM COATED, EXTENDED RELEASE ORAL DAILY
Qty: 30 TABLET | Refills: 0 | Status: SHIPPED | OUTPATIENT
Start: 2019-06-24 | End: 2019-07-24

## 2019-06-24 NOTE — PROGRESS NOTES
HPI:    Patient ID: Gian Kingsley is a 66year old male.     HPI  Patient presents with:  ER F/U: had low pottasium pt states he still feels kind of weak especially when walking he feels like if he would loose his balance     Quit alcohol on 6/17/19  None s well-developed and well-nourished. Cardiovascular: Normal rate, regular rhythm and normal heart sounds. Pulmonary/Chest: Effort normal and breath sounds normal.   Neurological: He is alert and oriented to person, place, and time. He displays no tremor.

## 2019-07-09 ENCOUNTER — OFFICE VISIT (OUTPATIENT)
Dept: FAMILY MEDICINE CLINIC | Facility: CLINIC | Age: 64
End: 2019-07-09
Payer: MEDICAID

## 2019-07-09 ENCOUNTER — APPOINTMENT (OUTPATIENT)
Dept: LAB | Age: 64
End: 2019-07-09
Attending: FAMILY MEDICINE
Payer: MEDICAID

## 2019-07-09 VITALS
SYSTOLIC BLOOD PRESSURE: 115 MMHG | TEMPERATURE: 98 F | WEIGHT: 157 LBS | DIASTOLIC BLOOD PRESSURE: 77 MMHG | HEIGHT: 65 IN | HEART RATE: 69 BPM | BODY MASS INDEX: 26.16 KG/M2

## 2019-07-09 DIAGNOSIS — E87.6 HYPOKALEMIA: ICD-10-CM

## 2019-07-09 DIAGNOSIS — R74.8 ELEVATED LIVER ENZYMES: ICD-10-CM

## 2019-07-09 DIAGNOSIS — F10.21 ALCOHOLISM IN REMISSION (HCC): ICD-10-CM

## 2019-07-09 DIAGNOSIS — I45.9 SKIPPED HEART BEATS: Primary | ICD-10-CM

## 2019-07-09 LAB
ALBUMIN SERPL-MCNC: 3.8 G/DL (ref 3.4–5)
ALP LIVER SERPL-CCNC: 68 U/L (ref 45–117)
ALT SERPL-CCNC: 53 U/L (ref 16–61)
ANION GAP SERPL CALC-SCNC: 7 MMOL/L (ref 0–18)
AST SERPL-CCNC: 41 U/L (ref 15–37)
BILIRUB DIRECT SERPL-MCNC: 0.2 MG/DL (ref 0–0.2)
BILIRUB SERPL-MCNC: 0.7 MG/DL (ref 0.1–2)
BUN BLD-MCNC: 12 MG/DL (ref 7–18)
BUN/CREAT SERPL: 15.8 (ref 10–20)
CALCIUM BLD-MCNC: 9 MG/DL (ref 8.5–10.1)
CHLORIDE SERPL-SCNC: 106 MMOL/L (ref 98–112)
CO2 SERPL-SCNC: 25 MMOL/L (ref 21–32)
CREAT BLD-MCNC: 0.76 MG/DL (ref 0.7–1.3)
GLUCOSE BLD-MCNC: 92 MG/DL (ref 70–99)
M PROTEIN MFR SERPL ELPH: 7.4 G/DL (ref 6.4–8.2)
OSMOLALITY SERPL CALC.SUM OF ELEC: 285 MOSM/KG (ref 275–295)
PATIENT FASTING: YES
POTASSIUM SERPL-SCNC: 4.2 MMOL/L (ref 3.5–5.1)
SODIUM SERPL-SCNC: 138 MMOL/L (ref 136–145)

## 2019-07-09 PROCEDURE — 80076 HEPATIC FUNCTION PANEL: CPT

## 2019-07-09 PROCEDURE — 80048 BASIC METABOLIC PNL TOTAL CA: CPT

## 2019-07-09 PROCEDURE — 36415 COLL VENOUS BLD VENIPUNCTURE: CPT

## 2019-07-09 PROCEDURE — 99214 OFFICE O/P EST MOD 30 MIN: CPT | Performed by: FAMILY MEDICINE

## 2019-07-09 NOTE — PROGRESS NOTES
HPI:    Patient ID: Jaziel Pandya is a 60year old male. HPI  Patient presents with:  Lab Results: follow up   patient here for follow up  Sober since 6/17  Started walking 5 miles daily. Lost 5 lbs. Taking potassium 20meq daily.     Not going to AA  D in remission (hcc)    1. Skipped heart beats  Exam normal  Check ekg  - EKG 12-LEAD; Future    2. Elevated liver enzymes  Repeat liver  Follow up GI  - HEPATIC FUNCTION PANEL (7); Future    3.  Alcoholism in remission ([facility] [address]  Sober since 6/17      Orders Jerry

## 2019-07-19 ENCOUNTER — LAB ENCOUNTER (OUTPATIENT)
Dept: LAB | Facility: HOSPITAL | Age: 64
End: 2019-07-19
Attending: INTERNAL MEDICINE
Payer: MEDICAID

## 2019-07-19 ENCOUNTER — OFFICE VISIT (OUTPATIENT)
Dept: GASTROENTEROLOGY | Facility: CLINIC | Age: 64
End: 2019-07-19
Payer: MEDICAID

## 2019-07-19 VITALS
WEIGHT: 153 LBS | SYSTOLIC BLOOD PRESSURE: 110 MMHG | HEART RATE: 62 BPM | BODY MASS INDEX: 25.49 KG/M2 | HEIGHT: 65 IN | DIASTOLIC BLOOD PRESSURE: 67 MMHG

## 2019-07-19 DIAGNOSIS — Z72.89 ALCOHOL USE: ICD-10-CM

## 2019-07-19 DIAGNOSIS — R79.89 ABNORMAL LFTS: ICD-10-CM

## 2019-07-19 DIAGNOSIS — R74.8 ELEVATED LIVER ENZYMES: ICD-10-CM

## 2019-07-19 DIAGNOSIS — R79.89 ABNORMAL LFTS: Primary | ICD-10-CM

## 2019-07-19 DIAGNOSIS — D69.6 THROMBOCYTOPENIA (HCC): ICD-10-CM

## 2019-07-19 LAB
HAV AB SER QL IA: REACTIVE
HAV IGM SER QL: NONREACTIVE
HBV CORE AB SERPL QL IA: NONREACTIVE
HBV SURFACE AB SER QL: NONREACTIVE
HBV SURFACE AB SERPL IA-ACNC: <3.1 MIU/ML
HBV SURFACE AG SER-ACNC: <0.1 [IU]/L
HBV SURFACE AG SERPL QL IA: NONREACTIVE
HCV AB SERPL QL IA: NONREACTIVE

## 2019-07-19 PROCEDURE — 36415 COLL VENOUS BLD VENIPUNCTURE: CPT

## 2019-07-19 PROCEDURE — 86709 HEPATITIS A IGM ANTIBODY: CPT

## 2019-07-19 PROCEDURE — 86706 HEP B SURFACE ANTIBODY: CPT

## 2019-07-19 PROCEDURE — 86704 HEP B CORE ANTIBODY TOTAL: CPT

## 2019-07-19 PROCEDURE — 87340 HEPATITIS B SURFACE AG IA: CPT

## 2019-07-19 PROCEDURE — 86803 HEPATITIS C AB TEST: CPT

## 2019-07-19 PROCEDURE — 99243 OFF/OP CNSLTJ NEW/EST LOW 30: CPT | Performed by: INTERNAL MEDICINE

## 2019-07-19 PROCEDURE — 86708 HEPATITIS A ANTIBODY: CPT

## 2019-07-19 NOTE — H&P
[facility], [facility] - Gastroenterology                                                                                                               Reason for consult: a Drug use: No       Medications (Active prior to today's visit):    Current Outpatient Medications:  Multiple Vitamin (TAB-A-MARY) Oral Tab Take 1 tablet by mouth once daily.  Disp:  Rfl: 3   Thiamine HCl 100 MG Oral Tab Take 1 tablet (100 mg total) by m of prostate cancer, etoh abuse who presents for abnormal LFTs. #Alcoholic hepatitis  #Alcohol abuse  #Abnormal LFTs  #Screening - patient says CLN in 2012 was normal    LFTs improved, likely transient alcoholic hepatitis.  Will check chronic liver diseas

## 2019-07-25 ENCOUNTER — OFFICE VISIT (OUTPATIENT)
Dept: SURGERY | Facility: CLINIC | Age: 64
End: 2019-07-25
Payer: MEDICAID

## 2019-07-25 VITALS
TEMPERATURE: 98 F | DIASTOLIC BLOOD PRESSURE: 84 MMHG | HEART RATE: 63 BPM | BODY MASS INDEX: 25 KG/M2 | SYSTOLIC BLOOD PRESSURE: 126 MMHG | WEIGHT: 153 LBS

## 2019-07-25 DIAGNOSIS — C61 PROSTATE CANCER (HCC): Primary | ICD-10-CM

## 2019-07-25 PROCEDURE — 99214 OFFICE O/P EST MOD 30 MIN: CPT | Performed by: UROLOGY

## 2019-07-25 NOTE — PROGRESS NOTES
[facility], [facility] Urology  Follow-Up Visit    HPI: Edilson iGpson is a 60year old male presents for a follow up visit. Patient was last seen on 8/7/2018.     INTERVAL HISTORY: Patient has still received no definitive treatment for his prostate cancer since [employer] Pulse 63   Temp 97.9 °F (36.6 °C) (Oral)   Wt 153 lb (69.4 kg)   BMI 25.46 kg/m²      Physical Exam    Constitutional: He is oriented to person, place, and time. He appears well-developed. HENT:   Head: Normocephalic. Cardiovascular: Normal rate.     Pu face-to-face discussion involving counseling, further management and treatment planning of his prostate cancer.      Risa Gilliam MD  7/25/2019

## 2019-07-30 ENCOUNTER — TELEPHONE (OUTPATIENT)
Dept: SURGERY | Facility: CLINIC | Age: 64
End: 2019-07-30

## 2019-07-30 NOTE — TELEPHONE ENCOUNTER
Assigned staff noted. I appears there is a referral in epic that was \"authorized. I verified the referral in the referral tab.

## 2019-07-30 NOTE — TELEPHONE ENCOUNTER
Staff , please see a message that appeared as a CC'd chart, in the in basket, that I noted at this time.  I have copied the message below from .(routed to assigned staff for follow through)_ please note that it appears there is a referral in epic that

## 2019-07-31 ENCOUNTER — TELEPHONE (OUTPATIENT)
Dept: GASTROENTEROLOGY | Facility: CLINIC | Age: 64
End: 2019-07-31

## 2019-07-31 ENCOUNTER — HOSPITAL ENCOUNTER (OUTPATIENT)
Dept: ULTRASOUND IMAGING | Facility: HOSPITAL | Age: 64
Discharge: HOME | End: 2019-07-31
Attending: INTERNAL MEDICINE
Payer: MEDICAID

## 2019-07-31 DIAGNOSIS — Z72.89 ALCOHOL USE: ICD-10-CM

## 2019-07-31 DIAGNOSIS — R79.89 ABNORMAL LFTS: ICD-10-CM

## 2019-07-31 DIAGNOSIS — K76.9 LIVER LESION: Primary | ICD-10-CM

## 2019-07-31 PROCEDURE — 76981 USE PARENCHYMA: CPT | Performed by: INTERNAL MEDICINE

## 2019-07-31 PROCEDURE — 76705 ECHO EXAM OF ABDOMEN: CPT | Performed by: INTERNAL MEDICINE

## 2019-07-31 NOTE — TELEPHONE ENCOUNTER
LEft message for patient, patient with negative active viral hepatitis. Should get Hep B vaccination which I advised. May be cheaper through his PCP office.     Also US results discussed as well, ovoid like mass in liver, needs f/u with MRI ab which I order

## 2019-07-31 NOTE — TELEPHONE ENCOUNTER
Managed Care- please initiate PA for MRI ordered by Dr. Yaneth Mckeon. Please inform once PA has been approved. Thank you.

## 2019-08-01 ENCOUNTER — TELEPHONE (OUTPATIENT)
Dept: GASTROENTEROLOGY | Facility: CLINIC | Age: 64
End: 2019-08-01

## 2019-08-01 NOTE — TELEPHONE ENCOUNTER
Pt's US has been denied by ins. Denial letter is being faxed to your office.  The following is the reason US has been denied; Based on [employer] Preface Imaging Guidelines Section: PREFACE 3 Clinical Information, we  are unable to approve this request. There

## 2019-08-02 NOTE — TELEPHONE ENCOUNTER
Received signed AOR form from pt to proceed with US liver elastography (CPT 42543/06744). Dr. Jimena Ambrose- please advise on letter of medical necessity for US liver elastography CPT 64257/82523, thank you.

## 2019-08-06 NOTE — TELEPHONE ENCOUNTER
Kimani Blum has been approved. Patient notified and he said he will call scheduling at a later time. Provided ph# for scheduling.      Thank you,  [facility]

## 2019-08-06 NOTE — TELEPHONE ENCOUNTER
Noted, thank you.   Future Appointments   Date Time Provider Ann Burger   8/19/2019  8:15 AM ADO MRI RM1 (1.5T) ADO MRI EM Octaviano   9/23/2019  8:00 AM Arturo Martinez MD [facility]

## 2019-08-12 NOTE — TELEPHONE ENCOUNTER
Noted, thank you. Appeal letter, AOR form, and clinicals faxed to [facility] Department at [phone], fax confirmation received 8/12/19 @ 2605.    -Awaiting insurance decision at this time.

## 2019-08-19 ENCOUNTER — HOSPITAL ENCOUNTER (OUTPATIENT)
Dept: MRI IMAGING | Age: 64
Discharge: HOME | End: 2019-08-19
Attending: INTERNAL MEDICINE
Payer: MEDICAID

## 2019-08-19 DIAGNOSIS — K76.9 LIVER LESION: ICD-10-CM

## 2019-08-19 PROCEDURE — 74183 MRI ABD W/O CNTR FLWD CNTR: CPT | Performed by: INTERNAL MEDICINE

## 2019-08-19 PROCEDURE — A9575 INJ GADOTERATE MEGLUMI 0.1ML: HCPCS | Performed by: INTERNAL MEDICINE

## 2019-08-21 ENCOUNTER — TELEPHONE (OUTPATIENT)
Dept: GASTROENTEROLOGY | Facility: CLINIC | Age: 64
End: 2019-08-21

## 2019-08-21 NOTE — TELEPHONE ENCOUNTER
I mailed out MRI results letter to pt  I called and spoke to pt informing him of results, reading letter and translating to [place] I scheduled F/U appt for 11/04/19 @ 9 am and I spoke to pt about MyChart, I sent him code to mobile.

## 2019-08-22 NOTE — TELEPHONE ENCOUNTER
Appeal letter received from [facility] stating that they denied our appeal on [phone] b/c there was already an approval on file for this CPT code. Discussed case with Chandan Child.  In PA department and states the following:  [8/22/2019 8:46 AM] Caitlin García

## 2019-09-01 ENCOUNTER — APPOINTMENT (OUTPATIENT)
Dept: RADIATION ONCOLOGY | Facility: HOSPITAL | Age: 64
End: 2019-09-01
Attending: RADIOLOGY
Payer: MEDICAID

## 2019-09-03 ENCOUNTER — TELEPHONE (OUTPATIENT)
Dept: SURGERY | Facility: CLINIC | Age: 64
End: 2019-09-03

## 2019-09-10 NOTE — CONSULTS
RADIATION ONCOLOGY NOTE    DATE OF VISIT: 9/12/2019    DIAGNOSIS :  Stage IIC  T1c N0 M0 , G4+3, PSA max 7.78, diagnosed in 2/2018 for staging MRI shortly.      Dear Aroldo Meadows and colleagues,    As you recall, Jaziel Pandya is a 60year old male, with his Psychiatric/Behavioral: Negative. Current Outpatient Medications:  Multiple Vitamin (TAB-A-MARY) Oral Tab Take 1 tablet by mouth once daily. Disp:  Rfl: 3   Thiamine HCl 100 MG Oral Tab Take 1 tablet (100 mg total) by mouth daily.  Disp: 90 tab Normal S1, S2   ABDOMEN:  Soft with no masses. JULIANA deferred given his MRI  EXTREMITIES:  There is no upper or lower extremity edema. NEUROLOGIC:  Cranial nerves II-XII are intact.   Neuro exam is nonfocal.    COMPLETED TESTS:  I have reviewed the patie D) - Prostate, D.  Right  Lateral Base                                                             E) - Prostate, E. Right Lateral Mid needle core biopsy:   · Benign prostatic tissue. L. Prostate, left transitional zone; needle core biopsy:   · Prostatic tissue with chronic inflammation. · No evidence of carcinoma identified.      Comment:  For  purposes, this case ha Specimen G is received in formalin labeled \"Arroyo,  left base\" and consists of one cylindrical core of tan tissue measuring 1.8 cm in length, 0.1 cm in diameter. The specimen is submitted in cassette G1.      Specimen H is received in formalin label

## 2019-09-12 ENCOUNTER — OFFICE VISIT (OUTPATIENT)
Dept: RADIATION ONCOLOGY | Facility: HOSPITAL | Age: 64
End: 2019-09-12
Attending: RADIOLOGY
Payer: MEDICAID

## 2019-09-12 VITALS
OXYGEN SATURATION: 97 % | BODY MASS INDEX: 25 KG/M2 | RESPIRATION RATE: 16 BRPM | TEMPERATURE: 98 F | DIASTOLIC BLOOD PRESSURE: 80 MMHG | HEART RATE: 58 BPM | WEIGHT: 147.19 LBS | SYSTOLIC BLOOD PRESSURE: 136 MMHG

## 2019-09-12 DIAGNOSIS — C61 PROSTATE CANCER (HCC): Primary | ICD-10-CM

## 2019-09-12 PROCEDURE — 99212 OFFICE O/P EST SF 10 MIN: CPT

## 2019-09-12 NOTE — PROGRESS NOTES
Nursing Consultation Note  Patient: Floridalma Torres  YOB: 1955  Age: 60year old  Radiation Oncologist: Dr. Amparo Olsen  Referring Physician: Kyaw Bernard  Diagnosis:No diagnosis found.   Consult Date: 9/12/2019      Chemotherapy: no  Labs: Revie [phone]  [address] [facility]  Phone: [phone] Fax: [address] [facility] [id number] - [place], [address] [facility], [phone], [phone] [address]   Phone: violence:        Fear of current or ex partner: Not on file        Emotionally abused: Not on file        Physically abused: Not on file        Forced sexual activity: Not on file    Other Topics      Concerns:        Grew up on a farm: Not Asked        Hi explained to the patient that today he would meet Dr. Valeria Guzmán but while being treated there was a possibility that he might also encounter the physician who covers for Dr. Valeria Guzmán which is Dr. Leticia Coto.   Made him aware that Dr. Leticia Coto is group partner with Dr. Jose Barker

## 2019-09-12 NOTE — PATIENT INSTRUCTIONS
Joleen Dasilva will call you once your MRI is approved by your insurance then you can call to schedule it. Dr. Polly Barahona will call you with the MRI results.

## 2019-09-26 ENCOUNTER — HOSPITAL ENCOUNTER (OUTPATIENT)
Dept: MRI IMAGING | Facility: HOSPITAL | Age: 64
Discharge: HOME | End: 2019-09-26
Attending: RADIOLOGY
Payer: MEDICAID

## 2019-09-26 DIAGNOSIS — C61 PROSTATE CANCER (HCC): ICD-10-CM

## 2019-09-26 LAB — CREAT BLD-MCNC: 0.8 MG/DL (ref 0.7–1.3)

## 2019-09-26 PROCEDURE — 72197 MRI PELVIS W/O & W/DYE: CPT | Performed by: RADIOLOGY

## 2019-09-26 PROCEDURE — A9575 INJ GADOTERATE MEGLUMI 0.1ML: HCPCS | Performed by: RADIOLOGY

## 2019-09-26 PROCEDURE — 82565 ASSAY OF CREATININE: CPT

## 2019-10-17 ENCOUNTER — OFFICE VISIT (OUTPATIENT)
Dept: FAMILY MEDICINE CLINIC | Facility: CLINIC | Age: 64
End: 2019-10-17
Payer: MEDICAID

## 2019-10-17 VITALS
HEIGHT: 65 IN | DIASTOLIC BLOOD PRESSURE: 80 MMHG | HEART RATE: 56 BPM | BODY MASS INDEX: 24.52 KG/M2 | WEIGHT: 147.19 LBS | TEMPERATURE: 98 F | SYSTOLIC BLOOD PRESSURE: 121 MMHG

## 2019-10-17 DIAGNOSIS — Z12.11 COLON CANCER SCREENING: ICD-10-CM

## 2019-10-17 DIAGNOSIS — F10.21 ALCOHOLISM IN REMISSION (HCC): ICD-10-CM

## 2019-10-17 DIAGNOSIS — E78.00 HYPERCHOLESTEREMIA: Primary | ICD-10-CM

## 2019-10-17 DIAGNOSIS — R94.6 BORDERLINE ABNORMAL THYROID FUNCTION TEST: ICD-10-CM

## 2019-10-17 PROBLEM — R21 RASH OF HANDS: Status: RESOLVED | Noted: 2017-12-11 | Resolved: 2019-10-17

## 2019-10-17 PROBLEM — L03.113 CELLULITIS OF RIGHT UPPER EXTREMITY: Status: RESOLVED | Noted: 2017-11-16 | Resolved: 2019-10-17

## 2019-10-17 PROCEDURE — 99214 OFFICE O/P EST MOD 30 MIN: CPT | Performed by: FAMILY MEDICINE

## 2019-10-17 NOTE — PROGRESS NOTES
HPI:    Patient ID: Lyubov Martinez is a 60year old male. HPI  Patient presents with: Follow - Up: cholesterol    Patient here for follow up  Doing better. Has not had any alcohol since last hospital stay.       Was in [facility] as brother passed away from place, and time. He appears well-developed and well-nourished. Eyes: Pupils are equal, round, and reactive to light. Neck: Normal range of motion. Neck supple. No thyromegaly present. Cardiovascular: Normal rate and regular rhythm. No murmur heard.

## 2019-10-21 ENCOUNTER — OFFICE VISIT (OUTPATIENT)
Dept: DERMATOLOGY CLINIC | Facility: CLINIC | Age: 64
End: 2019-10-21
Payer: MEDICAID

## 2019-10-21 DIAGNOSIS — L30.9 DERMATITIS: Primary | ICD-10-CM

## 2019-10-21 PROCEDURE — 99213 OFFICE O/P EST LOW 20 MIN: CPT | Performed by: DERMATOLOGY

## 2019-10-21 RX ORDER — BETAMETHASONE DIPROPIONATE 0.5 MG/G
1 CREAM TOPICAL 2 TIMES DAILY
Qty: 50 G | Refills: 3 | Status: SHIPPED | OUTPATIENT
Start: 2019-10-21 | End: 2020-03-04

## 2019-10-23 ENCOUNTER — APPOINTMENT (OUTPATIENT)
Dept: LAB | Age: 64
End: 2019-10-23
Attending: FAMILY MEDICINE
Payer: MEDICAID

## 2019-10-23 ENCOUNTER — LAB ENCOUNTER (OUTPATIENT)
Dept: LAB | Age: 64
End: 2019-10-23
Attending: FAMILY MEDICINE
Payer: MEDICAID

## 2019-10-23 DIAGNOSIS — E78.00 HYPERCHOLESTEREMIA: ICD-10-CM

## 2019-10-23 DIAGNOSIS — I45.9 SKIPPED HEART BEATS: ICD-10-CM

## 2019-10-23 DIAGNOSIS — R94.6 BORDERLINE ABNORMAL THYROID FUNCTION TEST: ICD-10-CM

## 2019-10-23 PROCEDURE — 80053 COMPREHEN METABOLIC PANEL: CPT

## 2019-10-23 PROCEDURE — 93005 ELECTROCARDIOGRAM TRACING: CPT

## 2019-10-23 PROCEDURE — 93010 ELECTROCARDIOGRAM REPORT: CPT | Performed by: FAMILY MEDICINE

## 2019-10-23 PROCEDURE — 80061 LIPID PANEL: CPT

## 2019-10-23 PROCEDURE — 84443 ASSAY THYROID STIM HORMONE: CPT

## 2019-10-23 PROCEDURE — 84439 ASSAY OF FREE THYROXINE: CPT

## 2019-10-23 PROCEDURE — 36415 COLL VENOUS BLD VENIPUNCTURE: CPT

## 2019-10-31 DIAGNOSIS — E78.00 HYPERCHOLESTEREMIA: Primary | ICD-10-CM

## 2019-10-31 DIAGNOSIS — R94.6 BORDERLINE ABNORMAL THYROID FUNCTION TEST: ICD-10-CM

## 2019-11-03 NOTE — PROGRESS NOTES
Kajal Gaytan is a 60year old male. Patient presents with:  Derm Problem: LOV 7/6/18. pt presenting with Dermatitis f/u to R hand and L Lower leg. pt states improvement. pt currently using Betamethasone and Elidel.             Patient has no known all daily., Disp: 90 tablet, Rfl: 3      Allergies:   No Known Allergies    Past Medical History:   Diagnosis Date   • [facility]    prostate cancer   • ETOH abuse    • Hyperlipidemia      Past Surgical History:   Procedure Laterality Date   • BIOPSY OF farm: Not Asked        History of tanning: Not Asked        Outdoor occupation: Not Asked        Pt has a pacemaker: No        Pt has a defibrillator: No        Reaction to local anesthetic: No    Social History Narrative      Not on file    Family History with dyschromia, lower left leg.   More generalized eczematous patches more prominent patches over the arms hands    Exam otherwise significant for multiple excoriations  Multiple light to medium brown, well marginated, uniformly pigmented, macules and papu Dermatitis  (primary encounter diagnosis)    No orders of the defined types were placed in this encounter. Results From Past 48 Hours:  No results found for this or any previous visit (from the past 48 hour(s)).     Meds This Visit:      Imaging

## 2019-11-04 ENCOUNTER — OFFICE VISIT (OUTPATIENT)
Dept: GASTROENTEROLOGY | Facility: CLINIC | Age: 64
End: 2019-11-04
Payer: MEDICAID

## 2019-11-04 ENCOUNTER — TELEPHONE (OUTPATIENT)
Dept: GASTROENTEROLOGY | Facility: CLINIC | Age: 64
End: 2019-11-04

## 2019-11-04 VITALS
HEART RATE: 69 BPM | DIASTOLIC BLOOD PRESSURE: 71 MMHG | SYSTOLIC BLOOD PRESSURE: 106 MMHG | BODY MASS INDEX: 28.5 KG/M2 | WEIGHT: 149 LBS | HEIGHT: 60.5 IN

## 2019-11-04 DIAGNOSIS — R79.89 ABNORMAL LFTS: Primary | ICD-10-CM

## 2019-11-04 PROCEDURE — 90471 IMMUNIZATION ADMIN: CPT | Performed by: INTERNAL MEDICINE

## 2019-11-04 PROCEDURE — 99213 OFFICE O/P EST LOW 20 MIN: CPT | Performed by: INTERNAL MEDICINE

## 2019-11-04 PROCEDURE — 90746 HEPB VACCINE 3 DOSE ADULT IM: CPT | Performed by: INTERNAL MEDICINE

## 2019-11-04 NOTE — PROGRESS NOTES
[address] [facility] Follow-up Visit    Elzbieta SURGICAL HISTORY Right 2010    Right hand surgery      Family History   Problem Relation Age of Onset   • Hypertension Father    • Diabetes Mother    • Diabetes Maternal Grandmother    • Cancer Neg    • Heart Disorder Neg    • Lipids Neg       Social Histo oropharynx clear, mucus membranes appear moist  CV- regular rate and rhythm, the extremities are warm and well perfused   Lung- Moves air well;  No labored breathing  Abdomen- soft, non-tender exam in all quadrants without rigidity or guarding, non-distende

## 2019-11-04 NOTE — TELEPHONE ENCOUNTER
Pt was here and 1st Hep B vaccine given per    Patient tolerated well and inform to return for his 2nd dose in Dec 4th

## 2019-11-05 ENCOUNTER — TELEPHONE (OUTPATIENT)
Dept: GASTROENTEROLOGY | Facility: CLINIC | Age: 64
End: 2019-11-05

## 2019-11-05 NOTE — TELEPHONE ENCOUNTER
Pt contacted and states he received the FIRST Hep B dose in OV on 11/4/2019 and wanted to schedule second dose.      His 2nd Hep B dose has been scheduled for the following, directions provided to the [facility] LINDSEY, and told to arrive 15 mins earlier:  Future Appoin

## 2019-12-04 ENCOUNTER — NURSE ONLY (OUTPATIENT)
Dept: GASTROENTEROLOGY | Facility: CLINIC | Age: 64
End: 2019-12-04
Payer: MEDICAID

## 2019-12-04 ENCOUNTER — TELEPHONE (OUTPATIENT)
Dept: GASTROENTEROLOGY | Facility: CLINIC | Age: 64
End: 2019-12-04

## 2019-12-04 DIAGNOSIS — R79.89 ELEVATED LFTS: Primary | ICD-10-CM

## 2019-12-04 PROBLEM — Z23 NEED FOR HEPATITIS B VACCINATION: Status: ACTIVE | Noted: 2019-12-04

## 2019-12-04 PROCEDURE — 90471 IMMUNIZATION ADMIN: CPT | Performed by: INTERNAL MEDICINE

## 2019-12-04 PROCEDURE — 90746 HEPB VACCINE 3 DOSE ADULT IM: CPT | Performed by: INTERNAL MEDICINE

## 2019-12-04 NOTE — TELEPHONE ENCOUNTER
MELLY;   ,can you please placed an order for Hepatitis B  Vaccine for today 2nd dose  and one for May 4 ,2020 for his 3rd dose.   Thank you,  Barbara Alonzo

## 2019-12-05 ENCOUNTER — OFFICE VISIT (OUTPATIENT)
Dept: SURGERY | Facility: CLINIC | Age: 64
End: 2019-12-05
Payer: MEDICAID

## 2019-12-05 VITALS
RESPIRATION RATE: 16 BRPM | HEIGHT: 65 IN | HEART RATE: 62 BPM | SYSTOLIC BLOOD PRESSURE: 118 MMHG | BODY MASS INDEX: 24.16 KG/M2 | WEIGHT: 145 LBS | DIASTOLIC BLOOD PRESSURE: 70 MMHG

## 2019-12-05 DIAGNOSIS — C61 PROSTATE CANCER (HCC): Primary | ICD-10-CM

## 2019-12-05 PROCEDURE — 99213 OFFICE O/P EST LOW 20 MIN: CPT | Performed by: UROLOGY

## 2019-12-05 NOTE — PROGRESS NOTES
[facility], [facility] Urology  Follow-Up Visit    HPI: Charles Lam is a 60year old male presents for a follow up visit. Patient was last seen on 7/25/2019.     INTERVAL HISTORY: Patient has still did not receive definitive treatment for his prostate cancer [employer]   Constitutional: Negative for appetite change, chills, fever and unexpected weight change. HENT: Negative for sore throat. Respiratory: Negative for shortness of breath. Cardiovascular: Negative for chest pain.    Gastrointestinal: Negative volume unfavorable intermediate risk prostate cancer in 02/2018 upon prostate biopsy for elevated screening PSA level. Patient still has not had any definitive local treatment since diagnosis. He wants to proceed with brachytherapy seed implants.  Discus

## 2020-01-21 ENCOUNTER — TELEPHONE (OUTPATIENT)
Dept: RADIATION ONCOLOGY | Facility: HOSPITAL | Age: 65
End: 2020-01-21

## 2020-02-10 ENCOUNTER — OFFICE VISIT (OUTPATIENT)
Dept: FAMILY MEDICINE CLINIC | Facility: CLINIC | Age: 65
End: 2020-02-10
Payer: MEDICAID

## 2020-02-10 ENCOUNTER — HOSPITAL ENCOUNTER (OUTPATIENT)
Dept: GENERAL RADIOLOGY | Age: 65
Discharge: HOME | End: 2020-02-10
Attending: FAMILY MEDICINE
Payer: MEDICAID

## 2020-02-10 VITALS
SYSTOLIC BLOOD PRESSURE: 118 MMHG | BODY MASS INDEX: 25.66 KG/M2 | TEMPERATURE: 97 F | WEIGHT: 154 LBS | HEART RATE: 60 BPM | HEIGHT: 65 IN | DIASTOLIC BLOOD PRESSURE: 78 MMHG

## 2020-02-10 DIAGNOSIS — M79.646 THUMB PAIN, UNSPECIFIED LATERALITY: ICD-10-CM

## 2020-02-10 DIAGNOSIS — C61 PROSTATE CANCER (HCC): ICD-10-CM

## 2020-02-10 DIAGNOSIS — F43.21 GRIEF REACTION: ICD-10-CM

## 2020-02-10 DIAGNOSIS — M21.619 BUNION OF GREAT TOE: ICD-10-CM

## 2020-02-10 DIAGNOSIS — M54.50 ACUTE LEFT-SIDED LOW BACK PAIN WITHOUT SCIATICA: ICD-10-CM

## 2020-02-10 DIAGNOSIS — M54.50 ACUTE LEFT-SIDED LOW BACK PAIN WITHOUT SCIATICA: Primary | ICD-10-CM

## 2020-02-10 PROCEDURE — 99214 OFFICE O/P EST MOD 30 MIN: CPT | Performed by: FAMILY MEDICINE

## 2020-02-10 PROCEDURE — 73130 X-RAY EXAM OF HAND: CPT | Performed by: FAMILY MEDICINE

## 2020-02-10 PROCEDURE — 72100 X-RAY EXAM L-S SPINE 2/3 VWS: CPT | Performed by: FAMILY MEDICINE

## 2020-02-10 NOTE — PROGRESS NOTES
HPI:    Patient ID: Charles Lam is a 60year old male. HPI  Patient presents with:   Foot Pain: has had the pain for awhile but getting worst   Hip Pain: mostly on left side   Knee Pain: bilateral   Hand Pain: mostly on thumbs     Patient here overall mostly on thumbs      Physical Exam   Constitutional: He appears well-developed and well-nourished. Cardiovascular: Normal rate, regular rhythm and normal heart sounds. Pulmonary/Chest: Effort normal and breath sounds normal.   Abdominal: Soft.  There i

## 2020-03-04 RX ORDER — BETAMETHASONE DIPROPIONATE 0.5 MG/G
CREAM TOPICAL
Qty: 50 G | Refills: 1 | Status: SHIPPED | OUTPATIENT
Start: 2020-03-04 | End: 2020-04-27

## 2020-03-31 ENCOUNTER — TELEPHONE (OUTPATIENT)
Dept: SURGERY | Facility: CLINIC | Age: 65
End: 2020-03-31

## 2020-03-31 NOTE — TELEPHONE ENCOUNTER
I called pt verified name/ informed pt due to the recent COVID-19 crisis we would like to josé luis pt meryl with Dr. Sudha Funes to May 2020, pt agrees.

## 2020-04-27 RX ORDER — BETAMETHASONE DIPROPIONATE 0.5 MG/G
CREAM TOPICAL
Qty: 45 G | Refills: 0 | Status: SHIPPED | OUTPATIENT
Start: 2020-04-27 | End: 2020-06-24

## 2020-04-27 RX ORDER — MULTIVITAMIN WITH FOLIC ACID 400 MCG
TABLET ORAL
Qty: 90 TABLET | Refills: 0 | Status: SHIPPED | OUTPATIENT
Start: 2020-04-27 | End: 2020-07-25

## 2020-04-27 RX ORDER — FOLIC ACID 1 MG/1
1 TABLET ORAL DAILY
Qty: 90 TABLET | Refills: 3 | Status: SHIPPED | OUTPATIENT
Start: 2020-04-27 | End: 2021-04-03

## 2020-05-04 ENCOUNTER — NURSE ONLY (OUTPATIENT)
Dept: GASTROENTEROLOGY | Facility: CLINIC | Age: 65
End: 2020-05-04
Payer: MEDICAID

## 2020-05-04 ENCOUNTER — TELEPHONE (OUTPATIENT)
Dept: GASTROENTEROLOGY | Facility: CLINIC | Age: 65
End: 2020-05-04

## 2020-05-04 DIAGNOSIS — Z23 NEED FOR PROPHYLACTIC VACCINATION AGAINST VIRAL HEPATITIS: Primary | ICD-10-CM

## 2020-05-04 PROCEDURE — 90746 HEPB VACCINE 3 DOSE ADULT IM: CPT | Performed by: INTERNAL MEDICINE

## 2020-05-04 PROCEDURE — 90471 IMMUNIZATION ADMIN: CPT | Performed by: INTERNAL MEDICINE

## 2020-05-04 NOTE — PATIENT INSTRUCTIONS
Patient tolerated injection well, given on Right Deltoid at 12:45pm.  DWZ:50019-589-91  ENGERIX-B  LOT:[id number]  EXP:03/01/2022

## 2020-05-04 NOTE — TELEPHONE ENCOUNTER
Dr. Cindy Nichols Pt is due for third Hep B dose today and has presented to the office for administration. Please place order so that the patient may receive, thank you. See TE from 12/4/2019 for more information regarding dose schedule. Patient is twitching and in constant motion. So states this is how pt is after receiving her methadone. Pt is not able to answer questions and is refusing vital signs.

## 2020-05-08 ENCOUNTER — TELEPHONE (OUTPATIENT)
Dept: SURGERY | Facility: CLINIC | Age: 65
End: 2020-05-08

## 2020-05-08 NOTE — TELEPHONE ENCOUNTER
I called pt verified name/, pt is scheduled for visit with Dr. Yaakov Radford on 2020 and pt saw Dr. Lyn Urrutia for consult, but no procedures are scheduled with Dr. Tom Cabrera office at this time.  Pt states as soon as he hears something from Dr. Lyn Urrutia

## 2020-05-27 ENCOUNTER — HOSPITAL ENCOUNTER (OUTPATIENT)
Dept: GENERAL RADIOLOGY | Facility: HOSPITAL | Age: 65
Discharge: HOME | End: 2020-05-27
Attending: PODIATRIST
Payer: MEDICAID

## 2020-05-27 ENCOUNTER — OFFICE VISIT (OUTPATIENT)
Dept: PODIATRY CLINIC | Facility: CLINIC | Age: 65
End: 2020-05-27
Payer: MEDICAID

## 2020-05-27 DIAGNOSIS — M79.672 LEFT FOOT PAIN: ICD-10-CM

## 2020-05-27 DIAGNOSIS — M21.611 BILATERAL BUNIONS: ICD-10-CM

## 2020-05-27 DIAGNOSIS — M79.675 PAIN OF TOE OF LEFT FOOT: ICD-10-CM

## 2020-05-27 DIAGNOSIS — M79.672 LEFT FOOT PAIN: Primary | ICD-10-CM

## 2020-05-27 DIAGNOSIS — M21.612 BILATERAL BUNIONS: ICD-10-CM

## 2020-05-27 PROCEDURE — 99243 OFF/OP CNSLTJ NEW/EST LOW 30: CPT | Performed by: PODIATRIST

## 2020-05-27 PROCEDURE — 73630 X-RAY EXAM OF FOOT: CPT | Performed by: PODIATRIST

## 2020-05-27 RX ORDER — MELATONIN
100 DAILY
Qty: 90 TABLET | Refills: 3 | Status: SHIPPED | OUTPATIENT
Start: 2020-05-27

## 2020-05-27 NOTE — PROGRESS NOTES
HPI:    Patient ID: Verenice Mathew is a 60year old male. This pleasant 63-year-old male presents as a new patient to me on consult from . Patient is here because of 2 concerns.   The first is in reference to the bump on both great toes and he state joint.  The fourth toe has edema but no erythema there is no break in the skin there is no profound deformity. There is some discomfort on direct palpation but not pain.   I sent this patient for x-ray it is my sense that the osseous structures are normal.

## 2020-06-24 RX ORDER — BETAMETHASONE DIPROPIONATE 0.5 MG/G
CREAM TOPICAL
Qty: 45 G | Refills: 1 | Status: SHIPPED | OUTPATIENT
Start: 2020-06-24 | End: 2020-07-23

## 2020-06-25 NOTE — TELEPHONE ENCOUNTER
Pt informed of rx refill. Pt states his condition is stable. Pt scheduled for f/u appt on 9/25/2020 with Raheem Magallon

## 2020-07-13 ENCOUNTER — OFFICE VISIT (OUTPATIENT)
Dept: FAMILY MEDICINE CLINIC | Facility: CLINIC | Age: 65
End: 2020-07-13
Payer: MEDICAID

## 2020-07-13 VITALS
HEIGHT: 65 IN | OXYGEN SATURATION: 97 % | BODY MASS INDEX: 24.66 KG/M2 | HEART RATE: 59 BPM | RESPIRATION RATE: 14 BRPM | SYSTOLIC BLOOD PRESSURE: 113 MMHG | TEMPERATURE: 98 F | WEIGHT: 148 LBS | DIASTOLIC BLOOD PRESSURE: 75 MMHG

## 2020-07-13 DIAGNOSIS — H93.8X1 POPPING OF RIGHT EAR: Primary | ICD-10-CM

## 2020-07-13 PROCEDURE — 99213 OFFICE O/P EST LOW 20 MIN: CPT | Performed by: PHYSICIAN ASSISTANT

## 2020-07-13 NOTE — PROGRESS NOTES
HPI:    Patient ID: Reyna Parr is a 63year old male. Patient presents with problem in right ear for the past days. No fevers. No draining. Does put Qtips in his ear. No sore throat or congestion noted. No allergies to medications noted.        Review normal and breath sounds normal. He has no wheezes. He has no rales. Lymphadenopathy:     He has no cervical adenopathy. Neurological: He is alert and oriented to person, place, and time. Skin: Skin is warm and dry. ASSESSMENT/PLAN:   1.

## 2020-07-23 RX ORDER — BETAMETHASONE DIPROPIONATE 0.5 MG/G
CREAM TOPICAL
Qty: 45 G | Refills: 1 | Status: SHIPPED | OUTPATIENT
Start: 2020-07-23 | End: 2020-10-19

## 2020-07-25 RX ORDER — MULTIVITAMIN WITH FOLIC ACID 400 MCG
TABLET ORAL
Qty: 90 TABLET | Refills: 0 | Status: SHIPPED | OUTPATIENT
Start: 2020-07-25 | End: 2020-12-14

## 2020-10-13 RX ORDER — MULTIVITAMIN WITH FOLIC ACID 400 MCG
TABLET ORAL
Qty: 90 TABLET | Refills: 0 | OUTPATIENT
Start: 2020-10-13

## 2020-10-19 ENCOUNTER — OFFICE VISIT (OUTPATIENT)
Dept: DERMATOLOGY CLINIC | Facility: CLINIC | Age: 65
End: 2020-10-19
Payer: MEDICAID

## 2020-10-19 DIAGNOSIS — D23.9 BENIGN NEOPLASM OF SKIN, UNSPECIFIED LOCATION: ICD-10-CM

## 2020-10-19 DIAGNOSIS — L30.9 DERMATITIS: Primary | ICD-10-CM

## 2020-10-19 PROCEDURE — 99213 OFFICE O/P EST LOW 20 MIN: CPT | Performed by: DERMATOLOGY

## 2020-10-19 RX ORDER — BETAMETHASONE DIPROPIONATE 0.5 MG/G
1 CREAM TOPICAL 2 TIMES DAILY PRN
Qty: 45 G | Refills: 1 | Status: SHIPPED | OUTPATIENT
Start: 2020-10-19

## 2020-10-20 ENCOUNTER — TELEPHONE (OUTPATIENT)
Dept: DERMATOLOGY CLINIC | Facility: CLINIC | Age: 65
End: 2020-10-20

## 2020-10-20 RX ORDER — CLOBETASOL PROPIONATE 0.5 MG/G
1 CREAM TOPICAL 2 TIMES DAILY
Qty: 60 G | Refills: 5 | Status: SHIPPED | OUTPATIENT
Start: 2020-10-20 | End: 2021-04-03

## 2020-10-20 NOTE — TELEPHONE ENCOUNTER
Called pt - no answer, VM full. [address] [facility], could not get connected - system prompted me to leave VM - VM left asking upgrade to follow up on coverage for pt for his clobetasol.

## 2020-10-20 NOTE — TELEPHONE ENCOUNTER
Prior authorization has been stated for Betamethasone Dipropionate Aug 0.05 % External Cream       KEY: [id number]

## 2020-10-26 NOTE — PROGRESS NOTES
Ellie Aguilar is a 63year old male. Patient presents with:  Dermatitis: LOV 10/21/19. pt presenting today with Dematitis f/u to R hand and L lower leg. pt states improvment since previous visit. Slight itching that comes and goes.  pt currenty using Be Betamethasone Dipropionate Aug 0.05 % External Cream Apply 1 Application topically 2 (two) times daily as needed. 45 g 1   • triamcinolone acetonide 0.1 % External Ointment Apply 1 Application topically 2 (two) times daily.  60 g 0   • tamsulosin (FLOMAX) c Transportation needs        Medical: Not on file        Non-medical: Not on file    Tobacco Use      Smoking status: Never Smoker      Smokeless tobacco: Never Used    Substance and Sexual Activity      Alcohol use: No        Alcohol/week: 0.0 standard dri appropriate/relevant lab results including pathology and past body maps reviewed. Updated and new information noted in current visit.      Patient is here with itching irritation over the right hand left pretibial leg, and area of itching swelling burning w to medium brown, well marginated, uniformly pigmented, macules and papules 6 mm and less over the back, chest, abdomen, bilateral arms, neck benign-appearing. Pigmented lesions examined with dermoscopy with benign patterns.     ASSESSMENT AND PLAN:     Juan exams reviewed. RTC as noted one month if not improving    The patient indicates understanding of these issues and agrees to the plan. The patient is asked to return as noted in follow-up/ above.     This note was generated using Dragon voice recognitio

## 2020-11-24 ENCOUNTER — OFFICE VISIT (OUTPATIENT)
Dept: SURGERY | Facility: CLINIC | Age: 65
End: 2020-11-24
Payer: MEDICAID

## 2020-11-24 VITALS
HEART RATE: 77 BPM | BODY MASS INDEX: 24 KG/M2 | SYSTOLIC BLOOD PRESSURE: 113 MMHG | DIASTOLIC BLOOD PRESSURE: 73 MMHG | WEIGHT: 145 LBS

## 2020-11-24 DIAGNOSIS — C61 PROSTATE CANCER (HCC): Primary | ICD-10-CM

## 2020-11-24 DIAGNOSIS — R30.0 DYSURIA: ICD-10-CM

## 2020-11-24 PROCEDURE — 3074F SYST BP LT 130 MM HG: CPT | Performed by: NURSE PRACTITIONER

## 2020-11-24 PROCEDURE — 99213 OFFICE O/P EST LOW 20 MIN: CPT | Performed by: NURSE PRACTITIONER

## 2020-11-24 PROCEDURE — 3078F DIAST BP <80 MM HG: CPT | Performed by: NURSE PRACTITIONER

## 2020-11-24 NOTE — PROGRESS NOTES
HPI:    Patient ID: Nena Diez is a 63year old male. HPI     Interval History: Patient is s/p prostate brachytherapy on 9/15/20. Reports he is doing well. Complaints of intermittent dysuria however this is overall improving.   Denies any difficulty External Cream Apply 1 Application topically 2 (two) times daily. 60 g 5   • Betamethasone Dipropionate Aug 0.05 % External Cream Apply 1 Application topically 2 (two) times daily as needed.  45 g 1   • triamcinolone acetonide 0.1 % External Ointment Apply place, and time. No distress. HENT:   Head: Normocephalic. Eyes: Conjunctivae are normal.   Neck: Normal range of motion. Cardiovascular: Normal rate. Pulmonary/Chest: Effort normal. No respiratory distress. Abdominal: Soft.  He exhibits no disten

## 2020-12-14 RX ORDER — MULTIVITAMIN WITH FOLIC ACID 400 MCG
TABLET ORAL
Qty: 90 TABLET | Refills: 0 | Status: SHIPPED | OUTPATIENT
Start: 2020-12-14 | End: 2021-02-04

## 2020-12-28 ENCOUNTER — OFFICE VISIT (OUTPATIENT)
Dept: FAMILY MEDICINE CLINIC | Facility: CLINIC | Age: 65
End: 2020-12-28
Payer: MEDICAID

## 2020-12-28 VITALS
HEART RATE: 71 BPM | WEIGHT: 155 LBS | RESPIRATION RATE: 16 BRPM | SYSTOLIC BLOOD PRESSURE: 117 MMHG | HEIGHT: 65 IN | DIASTOLIC BLOOD PRESSURE: 76 MMHG | TEMPERATURE: 98 F | BODY MASS INDEX: 25.83 KG/M2

## 2020-12-28 DIAGNOSIS — R10.32 LEFT LOWER QUADRANT ABDOMINAL PAIN: Primary | ICD-10-CM

## 2020-12-28 PROCEDURE — 99213 OFFICE O/P EST LOW 20 MIN: CPT | Performed by: FAMILY MEDICINE

## 2020-12-28 PROCEDURE — 3008F BODY MASS INDEX DOCD: CPT | Performed by: FAMILY MEDICINE

## 2020-12-28 PROCEDURE — 3078F DIAST BP <80 MM HG: CPT | Performed by: FAMILY MEDICINE

## 2020-12-28 PROCEDURE — 3074F SYST BP LT 130 MM HG: CPT | Performed by: FAMILY MEDICINE

## 2020-12-28 NOTE — PROGRESS NOTES
HPI:    Patient ID: Mahamed Currie is a 63year old male. Pt presents with some pain / discomfort of the left side of abdomen when he was bending over to tie his shoe. Pt denies any nausea/ vomiting. No diarrhea.  Pt has had occasional pinch of the same a patient, to monitor for symptoms and call if any significant symptoms; to follow up with general surgery if worse or sig symptoms/ hernia. Discussed avoidance of heavy lifting or bending.  Patient verbalized understanding of recommendations and agrees to pl

## 2021-01-11 ENCOUNTER — OFFICE VISIT (OUTPATIENT)
Dept: SURGERY | Facility: CLINIC | Age: 66
End: 2021-01-11
Payer: MEDICAID

## 2021-01-11 VITALS — BODY MASS INDEX: 26 KG/M2 | WEIGHT: 155 LBS

## 2021-01-11 DIAGNOSIS — R10.32 LEFT GROIN PAIN: ICD-10-CM

## 2021-01-11 DIAGNOSIS — K40.90 RIGHT INGUINAL HERNIA: Primary | ICD-10-CM

## 2021-01-11 PROCEDURE — 99244 OFF/OP CNSLTJ NEW/EST MOD 40: CPT | Performed by: SURGERY

## 2021-01-12 NOTE — PROGRESS NOTES
History and Physical      Edilson Gipson is a 63year old male. HPI   Patient presents with:  Abdominal Pain: Patient has complaints of pain in the lower left quadrant.   States it occured when he was tying his shoe, and continued for on week off and on DAILY. 90 tablet 3   • FOLIC ACID 1 MG Oral Tab TAKE 1 TABLET (1 MG TOTAL) BY MOUTH DAILY. 90 tablet 3   • Multiple Vitamin (TAB-A-MARY) Oral Tab Take 1 tablet by mouth once daily.   3     ALLERGIES  No Known Allergies    Social History    Socioeconomic His Right inguinal hernia  (primary encounter diagnosis)  Left groin pain    63year old male with some mild intermittent resolving L groin pain, perhaps due to muscle strain/occult injury.   No obvious hernia on exam, constipation and brachytherapy for prost

## 2021-01-13 ENCOUNTER — LAB ENCOUNTER (OUTPATIENT)
Dept: LAB | Age: 66
End: 2021-01-13
Attending: RADIOLOGY
Payer: MEDICAID

## 2021-01-13 DIAGNOSIS — C61 MALIGNANT NEOPLASM OF PROSTATE (HCC): ICD-10-CM

## 2021-01-13 LAB — PSA SERPL-MCNC: 2.52 NG/ML (ref ?–4)

## 2021-01-13 PROCEDURE — 84153 ASSAY OF PSA TOTAL: CPT

## 2021-01-13 PROCEDURE — 36415 COLL VENOUS BLD VENIPUNCTURE: CPT

## 2021-01-25 ENCOUNTER — OFFICE VISIT (OUTPATIENT)
Dept: SURGERY | Facility: CLINIC | Age: 66
End: 2021-01-25
Payer: MEDICAID

## 2021-01-25 VITALS
WEIGHT: 155 LBS | BODY MASS INDEX: 25.83 KG/M2 | RESPIRATION RATE: 16 BRPM | HEIGHT: 65 IN | DIASTOLIC BLOOD PRESSURE: 72 MMHG | HEART RATE: 74 BPM | SYSTOLIC BLOOD PRESSURE: 117 MMHG

## 2021-01-25 DIAGNOSIS — N52.36 ERECTILE DYSFUNCTION FOLLOWING INTERSTITIAL SEED THERAPY: ICD-10-CM

## 2021-01-25 DIAGNOSIS — C61 PROSTATE CANCER (HCC): Primary | ICD-10-CM

## 2021-01-25 DIAGNOSIS — N13.8 BPH WITH OBSTRUCTION/LOWER URINARY TRACT SYMPTOMS: ICD-10-CM

## 2021-01-25 DIAGNOSIS — N40.1 BPH WITH OBSTRUCTION/LOWER URINARY TRACT SYMPTOMS: ICD-10-CM

## 2021-01-25 PROCEDURE — 3078F DIAST BP <80 MM HG: CPT | Performed by: UROLOGY

## 2021-01-25 PROCEDURE — 99213 OFFICE O/P EST LOW 20 MIN: CPT | Performed by: UROLOGY

## 2021-01-25 PROCEDURE — 3008F BODY MASS INDEX DOCD: CPT | Performed by: UROLOGY

## 2021-01-25 PROCEDURE — 3074F SYST BP LT 130 MM HG: CPT | Performed by: UROLOGY

## 2021-01-25 NOTE — PROGRESS NOTES
[address] [facility] Urology  Follow-Up Visit    HPI: Siobhan Arrieta is a 63year old male presents for a follow up visit. Patient was last seen on 11/24/2020 by ESTEVAN Vega.     INTERVAL HISTORY: Underwent brachytherapy monotherapy seed implant at the negative. EXAM:  /72 (BP Location: Left arm, Patient Position: Sitting)   Pulse 74   Resp 16   Ht 5' 5\" (1.651 m)   Wt 155 lb (70.3 kg)   BMI 25.79 kg/m²      Physical Exam    Constitutional: He is oriented to person, place, and time.  He appea implants with Dr. Alejandra Briones 9/2020. No ADT. Discussed recent PSA results. Discussed prostate cancer surveillance per NCCN guidelines.      Voiding symptoms worsened likely related to brachytherapt, on Flomax which I recommend that he continue for the time b

## 2021-02-04 RX ORDER — MULTIVITAMIN WITH FOLIC ACID 400 MCG
TABLET ORAL
Qty: 90 TABLET | Refills: 0 | Status: SHIPPED | OUTPATIENT
Start: 2021-02-04 | End: 2021-12-27

## 2021-03-29 ENCOUNTER — IMMUNIZATION (OUTPATIENT)
Dept: LAB | Facility: HOSPITAL | Age: 66
End: 2021-03-29
Attending: HOSPITALIST
Payer: MEDICAID

## 2021-03-29 DIAGNOSIS — Z23 NEED FOR VACCINATION: Primary | ICD-10-CM

## 2021-03-29 PROCEDURE — 0011A SARSCOV2 VAC 100MCG/0.5ML IM: CPT

## 2021-04-03 RX ORDER — FOLIC ACID 1 MG/1
1 TABLET ORAL DAILY
Qty: 90 TABLET | Refills: 3 | Status: SHIPPED | OUTPATIENT
Start: 2021-04-03

## 2021-04-03 RX ORDER — CLOBETASOL PROPIONATE 0.5 MG/G
1 CREAM TOPICAL 2 TIMES DAILY
Qty: 60 G | Refills: 2 | Status: SHIPPED | OUTPATIENT
Start: 2021-04-03 | End: 2021-07-01

## 2021-04-16 ENCOUNTER — LAB ENCOUNTER (OUTPATIENT)
Dept: LAB | Age: 66
End: 2021-04-16
Attending: RADIOLOGY
Payer: MEDICAID

## 2021-04-16 DIAGNOSIS — C61 MALIGNANT NEOPLASM OF PROSTATE (HCC): ICD-10-CM

## 2021-04-16 PROCEDURE — 84153 ASSAY OF PSA TOTAL: CPT

## 2021-04-16 PROCEDURE — 36415 COLL VENOUS BLD VENIPUNCTURE: CPT

## 2021-04-26 ENCOUNTER — IMMUNIZATION (OUTPATIENT)
Dept: LAB | Facility: HOSPITAL | Age: 66
End: 2021-04-26
Attending: EMERGENCY MEDICINE
Payer: MEDICAID

## 2021-04-26 DIAGNOSIS — Z23 NEED FOR VACCINATION: Primary | ICD-10-CM

## 2021-04-26 PROCEDURE — 0012A SARSCOV2 VAC 100MCG/0.5ML IM: CPT

## 2021-04-28 ENCOUNTER — OFFICE VISIT (OUTPATIENT)
Dept: SURGERY | Facility: CLINIC | Age: 66
End: 2021-04-28
Payer: MEDICAID

## 2021-04-28 VITALS — SYSTOLIC BLOOD PRESSURE: 146 MMHG | HEART RATE: 80 BPM | DIASTOLIC BLOOD PRESSURE: 60 MMHG

## 2021-04-28 DIAGNOSIS — N52.36 ERECTILE DYSFUNCTION FOLLOWING INTERSTITIAL SEED THERAPY: ICD-10-CM

## 2021-04-28 DIAGNOSIS — C61 PROSTATE CANCER (HCC): Primary | ICD-10-CM

## 2021-04-28 DIAGNOSIS — N13.8 BPH WITH OBSTRUCTION/LOWER URINARY TRACT SYMPTOMS: ICD-10-CM

## 2021-04-28 DIAGNOSIS — N40.1 BPH WITH OBSTRUCTION/LOWER URINARY TRACT SYMPTOMS: ICD-10-CM

## 2021-04-28 PROCEDURE — 3077F SYST BP >= 140 MM HG: CPT | Performed by: UROLOGY

## 2021-04-28 PROCEDURE — 3078F DIAST BP <80 MM HG: CPT | Performed by: UROLOGY

## 2021-04-28 PROCEDURE — 99214 OFFICE O/P EST MOD 30 MIN: CPT | Performed by: UROLOGY

## 2021-04-28 NOTE — PROGRESS NOTES
[facility], [facility]  Follow-Up Visit    HPI: Derek Lang is a 63year old male presents for a follow up visit. Patient was last seen on 1/25/2021. Here by himself.      INTERVAL HISTORY: Underwent brachytherapy monotherapy seed implant at the [facility] 9/15 recovering alcoholic. He is a retired . Reviewed past medical, surgical, family, and social history. Reviewed med list and allergies. REVIEW OF SYSTEMS:  Pertinent positives and negatives per HPI.  A 5-point ROS was performed and is otherwis 02/2018 upon prostate biopsy for elevated screening PSA level by Dr. Miguel Kennedy. Patient did not pursue active treatment initially. Completed brachytherapy seed implants with Dr. Monroe Hollis 9/2020. No ADT. Discussed recent PSA results which are reassuring.  Yasmine Guzmán

## 2021-07-01 RX ORDER — CLOBETASOL PROPIONATE 0.5 MG/G
1 CREAM TOPICAL 2 TIMES DAILY
Qty: 60 G | Refills: 1 | Status: SHIPPED | OUTPATIENT
Start: 2021-07-01 | End: 2021-12-16

## 2021-07-30 RX ORDER — MULTIVITAMIN WITH FOLIC ACID 400 MCG
TABLET ORAL
Qty: 90 TABLET | Refills: 0 | OUTPATIENT
Start: 2021-07-30

## 2021-08-11 NOTE — ED INITIAL ASSESSMENT (HPI)
Mayo Rodriguez is here for alcohol withdrawal.  Complaints of chills and difficulty sleeping. Last drink yesterday.

## 2021-08-11 NOTE — ED PROVIDER NOTES
Patient Seen in: [facility] AND [facility] Emergency Department      History   Patient presents with:  Brendon-FRED    Stated Complaint: etoh withdrawal    HPI/Subjective:   HPI  82-year-old male with history of prostate cancer and heavy alcohol use presents for alcoh Appearance: He is well-developed. HENT:      Head: Normocephalic and atraumatic. Mouth/Throat:      Mouth: Mucous membranes are moist.      Pharynx: Oropharynx is clear.       Comments: No tongue fasciculations  Eyes:      Extraocular Movements: Ex -----------         ------                     CBC W/ DIFFERENTIAL[[id number]]                              Final result                 Please view results for these tests on the individual orders.    RAINBOW DRAW LAVENDER   RAINBOW DRAW LIGHT GREEN   RAINB

## 2021-08-11 NOTE — ED QUICK NOTES
rec'd care of pt from triage. ambulatory with steady gait. States he has been drinking with heavily over the last few weeks. Now wants help to stop drinking. Last drink was last night. C/o feeling anxious and not sleeping well.  Calm and cooperative at this

## 2021-08-27 RX ORDER — MULTIVITAMIN WITH FOLIC ACID 400 MCG
TABLET ORAL
Qty: 90 TABLET | Refills: 0 | OUTPATIENT
Start: 2021-08-27

## 2021-09-08 NOTE — ED INITIAL ASSESSMENT (HPI)
ETOH binge for the past few weeks reporting 20-24 \"Way's\" beers daily. Pt seen at [facility] 8/11 for same. Reports trouble sleeping and anxiety. Pt reports last drink this AM having around 6 beers today. Denies hx withdrawal seizures.

## 2021-09-08 NOTE — ED PROVIDER NOTES
Patient Seen in: [facility] AND [facility] Emergency Department      History   Patient presents with:  Eval-D    Stated Complaint: Trouble sleeping, anxiety    HPI/Subjective:   HPI    79-year-old male with history of hyperlipidemia and alcohol abuse presents re no retractions, lungs are clear to auscultation  Cardiovascular: Slightly tachycardic, regular rhythm  Gastrointestinal:  abdomen is soft and non tender, no masses, bowel sounds normal  Neurological: Speech normal.  Moving extremities equally x4.   No tremo no disposition time on file for this visit.     Follow-up:  Niki Katz PA-C  [address] [place] [address]   [phone]                Medications Prescribed:  Current Discharge Medication List

## 2021-09-09 NOTE — BH LEVEL OF CARE ASSESSMENT
Crisis Evaluation Assessment    Ceci Trevino YOB: 1955   Age 61year old MRN [id number]   Location [address] Attending Ramon Colon MD      Patient's legal sex: male  Patient identifies as: male  Patient's b alcohol use starting at age 34. Patient supports self as a . Non-Suicidal Self-Injury:   Patient denies present or past non-suicidal self-injury.       Access to Means:  Access to Means  Has access to means to attempt suicide or harm others or p following alcohol withdrawal 8/11/2021, in addition to similar visit on 6/17/2019. Patient reported history of detox at [facility] [place]. Patient denied prescription of psychiatric medications.  Patient declined outpatient referrals, but was open to the shaunna by: Patient is aware of symptoms and hopeful for recovery. Judgment: Fair  Fair/poor judgment as evidenced by: Patient reached out for help but had alcohol to help with sleep.   Thought Patterns  Clarity/Relevance: Coherent;Logical;Relevant to topic  Flow: Diagnoses:  Primary Psychiatric Diagnosis  F10.20 Alcohol Use          Julianna VARGHESE LPC

## 2021-09-23 RX ORDER — MULTIVITAMIN WITH FOLIC ACID 400 MCG
TABLET ORAL
Qty: 90 TABLET | Refills: 0 | OUTPATIENT
Start: 2021-09-23

## 2021-09-23 NOTE — TELEPHONE ENCOUNTER
Protocol failed or has No Protocol, please review  Requested Prescriptions   Pending Prescriptions Disp Refills    TAB-A-MARY Oral Tab [Pharmacy Med Name: TAB-A-MARY  TABLET] 90 tablet 0     Sig: TAKE 1 TABLET BY MOUTH DAILY.         There is no refill protocol information for this order         Future Appointments         Provider Department Appt Notes    In 1 month Louis Jeffery MD [facility], [address], [address] Outpatient Visits              4 months ago Prostate cancer [facility])    [facility] for Bernardo Bentley MD    Office Visit    5 months ago Malignant neoplasm of [facility])    [address], Elena Corbin MD    Office Visit    8 months ago Prostate cancer [facility])    [facility] for Daphine Junes, MD    Office Visit    8 months ago Malignant neoplasm of [facility])    [address], Elena Corbin MD    Office Visit    8 months ago Right inguinal hernia    [facility] Surgery Danika Anders MD    Office Visit

## 2021-09-23 NOTE — TELEPHONE ENCOUNTER
Spoke with patient, (  verified ) informed RX request was sent , states he does still take this med and needs a refill

## 2021-10-13 NOTE — ED PROVIDER NOTES
Patient Seen in: [facility] AND [facility] Emergency Department      History   Patient presents with:  Eval-D    Stated Complaint: insomnia, alcohol detox? Subjective:   HPI    Patient is a 81-year-old male who presents requesting alcohol withdrawal help.   H Exam    GENERAL: No acute distress, awake and alert  HEENT: MMM, EOMI, PERRL, no nystagmus  Neck: supple, non tender  CV: RRR, no murmurs  Resp: CTAB, no wheezes or retractions  Ab: soft, nontender, no distension  Extremities: FROM of all extremities, no c resources for detox                           Disposition and Plan     Clinical Impression:  Alcoholism [facility])  (primary encounter diagnosis)     Disposition:  Discharge  10/13/2021  1:36 pm    Follow-up:  Oscar Magdaleno PA-C  [address]

## 2021-10-13 NOTE — ED INITIAL ASSESSMENT (HPI)
Pt reports \" I am not too proud but I have been drinking\", pt reports last drink this am, pt reports that unable to sleep and feeling anxious, \" I want to get help\"

## 2021-10-13 NOTE — ED QUICK NOTES
Pt reports his last drink was this AM. Patient reports last time he came to the ED he did not follow up with detox resources. Patient asked if he was going to follow up with detox resources today, pt responded \"Hopefully I think I will. \"

## 2021-11-14 RX ORDER — MULTIVITAMIN WITH FOLIC ACID 400 MCG
TABLET ORAL
Qty: 90 TABLET | Refills: 0 | OUTPATIENT
Start: 2021-11-14

## 2021-12-01 NOTE — ED QUICK NOTES
61year old male here for detox pt reports that drinks ETOH daily, last drink one hour ago, pt reports looking for help to stop drinking, denies hx of seizures

## 2021-12-01 NOTE — ED INITIAL ASSESSMENT (HPI)
Patient presents to ER with complaints of increased drinking, states he is unable to help on his own. He experiences the shakes, and detox symptoms. Looking for help to stop. Last drink 1 hour ago. Admits to drinking 12 pack of beer daily.    Denies

## 2021-12-01 NOTE — ED NOTES
Consulted with Sumi HARTMAN. Patient is requested help to stop drinking. Safety issues are denied. Called Luis Manuel with [facility]. He will see the patient after he finishes seeing another patient in the ER.

## 2021-12-01 NOTE — ED PROVIDER NOTES
Patient Seen in: [facility] AND [facility] Emergency Department      History   Patient presents with:  Eval-D    Stated Complaint: etoh     Subjective:   HPI    17-year-old male with history of hyperlipidemia and alcohol abuse presents requesting alcohol detox. Current:BP (!) 152/92   Pulse 107   Temp 98.1 °F (36.7 °C) (Temporal)   Resp 14   Ht 165.1 cm (5' 5\")   Wt 65.8 kg   SpO2 96%   BMI 24.13 kg/m²         Physical Exam      General Appearance: alert, no distress  Eyes: pupils equal and round no pallor comfortably. Patient was given resources for outpatient alcohol detox.                              Disposition and Plan     Clinical Impression:  Alcohol abuse  (primary encounter diagnosis)     Disposition:  Discharge  12/1/2021  2:49 pm    Follow-up:  DENNY

## 2021-12-01 NOTE — ED QUICK NOTES
Patient ambulatory out of ED in no apparent distress. Verbalized understanding of discharge instructions. Iv removed.

## 2021-12-01 NOTE — ED NOTES
Scanned [facility] Intervention Note into Epic. Spoke to Tyrel. Patient was offered assistance with going to residential treatment. Patient responded that he wants to wait. Referrals were provided.

## 2021-12-08 ENCOUNTER — PATIENT OUTREACH (OUTPATIENT)
Dept: CASE MANAGEMENT | Age: 66
End: 2021-12-08

## 2021-12-08 NOTE — PROGRESS NOTES
Received VM from patient requesting to reschedule apt with Floydene Fore to patient and transferred him over to 's office [id number] for assistance with rescheduling

## 2021-12-16 RX ORDER — CLOBETASOL PROPIONATE 0.5 MG/G
1 CREAM TOPICAL 2 TIMES DAILY
Qty: 60 G | Refills: 1 | Status: SHIPPED | OUTPATIENT
Start: 2021-12-16 | End: 2022-01-25

## 2021-12-21 ENCOUNTER — LAB ENCOUNTER (OUTPATIENT)
Dept: LAB | Age: 66
End: 2021-12-21
Attending: RADIOLOGY
Payer: MEDICAID

## 2021-12-21 DIAGNOSIS — C61 MALIGNANT NEOPLASM OF PROSTATE (HCC): ICD-10-CM

## 2021-12-21 PROCEDURE — 36415 COLL VENOUS BLD VENIPUNCTURE: CPT

## 2021-12-21 PROCEDURE — 84153 ASSAY OF PSA TOTAL: CPT

## 2021-12-22 ENCOUNTER — OFFICE VISIT (OUTPATIENT)
Dept: SURGERY | Facility: CLINIC | Age: 66
End: 2021-12-22
Payer: MEDICAID

## 2021-12-22 DIAGNOSIS — C61 PROSTATE CANCER (HCC): ICD-10-CM

## 2021-12-22 DIAGNOSIS — R31.0 GROSS HEMATURIA: Primary | ICD-10-CM

## 2021-12-22 DIAGNOSIS — N13.8 BPH WITH OBSTRUCTION/LOWER URINARY TRACT SYMPTOMS: ICD-10-CM

## 2021-12-22 DIAGNOSIS — N40.1 BPH WITH OBSTRUCTION/LOWER URINARY TRACT SYMPTOMS: ICD-10-CM

## 2021-12-22 DIAGNOSIS — R30.0 DYSURIA: ICD-10-CM

## 2021-12-22 PROCEDURE — 99214 OFFICE O/P EST MOD 30 MIN: CPT | Performed by: UROLOGY

## 2021-12-22 PROCEDURE — 81003 URINALYSIS AUTO W/O SCOPE: CPT | Performed by: UROLOGY

## 2021-12-22 RX ORDER — SULFAMETHOXAZOLE AND TRIMETHOPRIM 800; 160 MG/1; MG/1
1 TABLET ORAL 2 TIMES DAILY
Qty: 14 TABLET | Refills: 0 | Status: SHIPPED | OUTPATIENT
Start: 2021-12-22 | End: 2021-12-29

## 2021-12-22 NOTE — PROGRESS NOTES
[facility], [facility] Urology  Follow-Up Visit    HPI: Bautista Jaquez is a 61year old male presents for a follow up visit. Patient was last seen on 4/28/2021. Here by himself. INTERVAL HISTORY: Prostate cancer diagnosed 2/2018.  underwent brachytherapy monoth . Reviewed past medical, surgical, family, and social history. Reviewed med list and allergies. REVIEW OF SYSTEMS:  Pertinent positives and negatives per HPI. A 10-point ROS was performed and is otherwise negative.        EXAM:  There were n year old male who was diagnosed with low volume unfavorable intermediate risk prostate cancer in 02/2018 upon prostate biopsy for elevated screening PSA level by Dr. Bernardo Villagomez. Patient did not pursue active treatment initially.  Completed brachytherapy seed

## 2021-12-27 RX ORDER — MULTIVITAMIN WITH FOLIC ACID 400 MCG
TABLET ORAL
Qty: 90 TABLET | Refills: 0 | Status: SHIPPED | OUTPATIENT
Start: 2021-12-27

## 2021-12-27 NOTE — TELEPHONE ENCOUNTER
Message noted: Chart reviewed and may refill medications as requested. Prescription sent to listed pharmacy.  Further refills as per Dr. Mari Santillan

## 2022-01-01 NOTE — PATIENT INSTRUCTIONS
Claritin, Zyrtec or Allegra daily  Zyrtec can make people drowsy, so try taking it a few hours before bed so you can sleep it off. If you still feel drowsy then start a different. One.    Vitor Garcia is stronger than the other two, so start it only if you think Right ear hearing screen completed date: 2022  Right ear screen method: EOAE (evoked otoacoustic emission)  Right ear screen result: Passed  Right ear screen comment: N/A    Left ear hearing screen completed date: 2022  Left ear screen method: EOAE (evoked otoacoustic emission)  Left ear screen result: Passed  Left ear screen comments: N/A

## 2022-01-19 ENCOUNTER — HOSPITAL ENCOUNTER (OUTPATIENT)
Age: 67
Discharge: HOME | End: 2022-01-19
Payer: MEDICAID

## 2022-01-19 ENCOUNTER — HOSPITAL ENCOUNTER (OUTPATIENT)
Dept: CT IMAGING | Facility: HOSPITAL | Age: 67
Discharge: HOME | End: 2022-01-19
Attending: UROLOGY
Payer: MEDICAID

## 2022-01-19 VITALS
WEIGHT: 145 LBS | SYSTOLIC BLOOD PRESSURE: 122 MMHG | TEMPERATURE: 98 F | DIASTOLIC BLOOD PRESSURE: 95 MMHG | HEIGHT: 65 IN | OXYGEN SATURATION: 95 % | BODY MASS INDEX: 24.16 KG/M2 | HEART RATE: 84 BPM | RESPIRATION RATE: 18 BRPM

## 2022-01-19 DIAGNOSIS — R31.0 GROSS HEMATURIA: ICD-10-CM

## 2022-01-19 DIAGNOSIS — Z20.822 ENCOUNTER FOR LABORATORY TESTING FOR COVID-19 VIRUS: Primary | ICD-10-CM

## 2022-01-19 DIAGNOSIS — J02.9 SORE THROAT: ICD-10-CM

## 2022-01-19 DIAGNOSIS — J06.9 UPPER RESPIRATORY TRACT INFECTION, UNSPECIFIED TYPE: ICD-10-CM

## 2022-01-19 LAB
CREAT BLD-MCNC: 0.7 MG/DL
S PYO AG THROAT QL: NEGATIVE
SARS-COV-2 RNA RESP QL NAA+PROBE: NOT DETECTED

## 2022-01-19 PROCEDURE — 74178 CT ABD&PLV WO CNTR FLWD CNTR: CPT | Performed by: UROLOGY

## 2022-01-19 PROCEDURE — 76377 3D RENDER W/INTRP POSTPROCES: CPT | Performed by: UROLOGY

## 2022-01-19 PROCEDURE — 99213 OFFICE O/P EST LOW 20 MIN: CPT | Performed by: EMERGENCY MEDICINE

## 2022-01-19 PROCEDURE — 87880 STREP A ASSAY W/OPTIC: CPT | Performed by: EMERGENCY MEDICINE

## 2022-01-19 PROCEDURE — U0002 COVID-19 LAB TEST NON-CDC: HCPCS | Performed by: EMERGENCY MEDICINE

## 2022-01-19 PROCEDURE — 82565 ASSAY OF CREATININE: CPT

## 2022-01-19 NOTE — ED PROVIDER NOTES
Patient Seen in: [facility]      History   No chief complaint on file. Stated Complaint: Sore throat    Subjective:   HPI  Thania Ponce is a 61year old  male sore throat for 3 days and sinus congestion for 3-4 days.  generalized sx for si src Temporal   SpO2 95 %   O2 Device None (Room air)       Current:BP (!) 122/95   Pulse 84   Temp 97.7 °F (36.5 °C) (Temporal)   Resp 18   Ht 165.1 cm (5' 5\")   Wt 65.8 kg   SpO2 95%   BMI 24.13 kg/m²         Physical Exam  Vitals and nursing note review ED Course     Labs Reviewed   POCT RAPID STREP - Normal   RAPID SARS-COV-2 BY PCR               MDM   Oral airways clear, No hot potato voice, and no signs of compromise.  Tolerating PO.     COVID test: (-)  STREP test: (-)      Home care, and re-testing for 90 days. Individuals can stay Positive for 90 days. If there is suspicion of testing site credibility do not test at that site. hours and operation of [facility] locations given.     Medical Record Review/reassessment: I independently  reviewed verna

## 2022-01-25 RX ORDER — CLOBETASOL PROPIONATE 0.5 MG/G
1 CREAM TOPICAL 2 TIMES DAILY
Qty: 60 G | Refills: 0 | Status: SHIPPED | OUTPATIENT
Start: 2022-01-25 | End: 2023-01-25

## 2022-02-18 ENCOUNTER — TELEPHONE (OUTPATIENT)
Dept: SURGERY | Facility: CLINIC | Age: 67
End: 2022-02-18

## 2022-02-19 ENCOUNTER — LAB ENCOUNTER (OUTPATIENT)
Dept: LAB | Age: 67
End: 2022-02-19
Attending: RADIOLOGY
Payer: MEDICAID

## 2022-02-19 DIAGNOSIS — C61 MALIGNANT NEOPLASM OF PROSTATE (HCC): ICD-10-CM

## 2022-02-19 LAB — PSA SERPL-MCNC: 1.28 NG/ML (ref ?–4)

## 2022-02-19 PROCEDURE — 84153 ASSAY OF PSA TOTAL: CPT

## 2022-02-19 PROCEDURE — 36415 COLL VENOUS BLD VENIPUNCTURE: CPT

## 2022-02-24 ENCOUNTER — TELEPHONE (OUTPATIENT)
Dept: SURGERY | Facility: CLINIC | Age: 67
End: 2022-02-24

## 2022-02-27 ENCOUNTER — APPOINTMENT (OUTPATIENT)
Dept: CT IMAGING | Facility: HOSPITAL | Age: 67
End: 2022-02-27
Attending: EMERGENCY MEDICINE
Payer: MEDICAID

## 2022-02-27 ENCOUNTER — HOSPITAL ENCOUNTER (EMERGENCY)
Facility: HOSPITAL | Age: 67
Discharge: HOME | End: 2022-02-27
Attending: EMERGENCY MEDICINE
Payer: MEDICAID

## 2022-02-27 VITALS
SYSTOLIC BLOOD PRESSURE: 142 MMHG | OXYGEN SATURATION: 95 % | TEMPERATURE: 98 F | HEIGHT: 65 IN | BODY MASS INDEX: 24.16 KG/M2 | HEART RATE: 70 BPM | RESPIRATION RATE: 16 BRPM | DIASTOLIC BLOOD PRESSURE: 96 MMHG | WEIGHT: 145 LBS

## 2022-02-27 DIAGNOSIS — R30.0 DYSURIA: ICD-10-CM

## 2022-02-27 DIAGNOSIS — R31.9 HEMATURIA, UNSPECIFIED TYPE: Primary | ICD-10-CM

## 2022-02-27 LAB
BILIRUB UR QL: NEGATIVE
CLARITY UR: CLEAR
COLOR UR: YELLOW
GLUCOSE UR-MCNC: NEGATIVE MG/DL
KETONES UR-MCNC: NEGATIVE MG/DL
LEUKOCYTE ESTERASE UR QL STRIP.AUTO: NEGATIVE
NITRITE UR QL STRIP.AUTO: NEGATIVE
PH UR: 9 [PH] (ref 5–8)
PROT UR-MCNC: NEGATIVE MG/DL
RBC #/AREA URNS AUTO: >10 /HPF
SP GR UR STRIP: 1.01 (ref 1–1.03)
UROBILINOGEN UR STRIP-ACNC: <2

## 2022-02-27 PROCEDURE — 99284 EMERGENCY DEPT VISIT MOD MDM: CPT

## 2022-02-27 PROCEDURE — 74176 CT ABD & PELVIS W/O CONTRAST: CPT | Performed by: EMERGENCY MEDICINE

## 2022-02-27 PROCEDURE — 81001 URINALYSIS AUTO W/SCOPE: CPT | Performed by: EMERGENCY MEDICINE

## 2022-02-27 RX ORDER — SULFAMETHOXAZOLE AND TRIMETHOPRIM 800; 160 MG/1; MG/1
1 TABLET ORAL 2 TIMES DAILY
Qty: 14 TABLET | Refills: 0 | Status: SHIPPED | OUTPATIENT
Start: 2022-02-27 | End: 2022-03-06

## 2022-03-01 ENCOUNTER — NURSE ONLY (OUTPATIENT)
Dept: SURGERY | Facility: CLINIC | Age: 67
End: 2022-03-01
Payer: MEDICAID

## 2022-03-01 ENCOUNTER — TELEPHONE (OUTPATIENT)
Dept: SURGERY | Facility: CLINIC | Age: 67
End: 2022-03-01

## 2022-03-01 DIAGNOSIS — R30.0 DYSURIA: Primary | ICD-10-CM

## 2022-03-01 PROCEDURE — 51702 INSERT TEMP BLADDER CATH: CPT | Performed by: UROLOGY

## 2022-03-01 NOTE — TELEPHONE ENCOUNTER
Pt would like a call back regarding a ER visit on  2/27/22. Pt is having painful urination symptoms and has Frequent  Urination. Pt is asking if there is something he could take. Pt also is scheduled for Cysto  4/ 4/22. Please call . ,

## 2022-03-01 NOTE — TELEPHONE ENCOUNTER
Pt was bladder scanned and PVR was 567 ml. Results given to [facility], and 16 F coude catheter was inserted. Pt instructed to come in next week for voiding trial/decath. Routed to [facility];  -Please review and advise. Thank you.

## 2022-03-01 NOTE — PROGRESS NOTES
Pt here today for bladder scan due to dysuria, urinary frequency, and pain. Pt recently in ER on 2/27/22 for dysuria. Pt was asked to void first and afterwards bladder scan showed 567 ml PVR. Results given to [facility] since [address] in surgery, who then gave verbal orders to insert 16 F coude catheter. Explained procedure to pt. Pt at first did not want catheter placed. Educated and explained the reason for catheter placement and risks associated with urinary retention such as severe infection and hydronephrosis. Pt then agreed to catheter placement. Pt was prepped using betadine swabs and urojet lidocaine gel. [address] catheter inserted under sterile precautions without resistance. A total of 600 ml of clear yellow urine was obtained after patrick insertion. Leg bag and stat lock applied to right leg. Pt having slight burning with insertion of catheter, instructed pt to take tylenol prn and AZO prn. Made pt appointment for voiding trial/decath for next Wednesday 3/9 at 0900 per [facility]. Urine specimen obtained and collected for culture. Will forwards these results to [address] as well.

## 2022-03-01 NOTE — TELEPHONE ENCOUNTER
Pt states he is having painful urination since Sunday. Complaints of burning, Antibiotics are helping a little but pt does not feel like he is emptying his bladder due to urinary frequency, urinating q 5-10 minutes and small amounts. Walking helps relieve the pain. Pt states he is still in building. Asked him to come back to office for bladder scan.     Future Appointments   Date Time Provider Ann Burger   3/1/2022  9:20 AM [address] [place] [address]   4/4/2022  8:00 AM Wendy Vallejo MD [facility]

## 2022-03-03 ENCOUNTER — TELEPHONE (OUTPATIENT)
Dept: SURGERY | Facility: CLINIC | Age: 67
End: 2022-03-03

## 2022-03-03 NOTE — TELEPHONE ENCOUNTER
-Pt brought to exam room #5; identity verified with name & .  -He reports intermittent pain in his penis ranging from #4-8/ 10. We determined this was due to bladder spasms.  -Pt instructed to avoid caffeine & pop. Also, not to [place] down\" to produce a BM. -Pt states he was unaware of the above information, & felt better knowing this information.  -He was encouraged to contact the Call-Center with concerns; & given their number.  -Encounter complete.

## 2022-03-04 ENCOUNTER — TELEPHONE (OUTPATIENT)
Dept: SURGERY | Facility: CLINIC | Age: 67
End: 2022-03-04

## 2022-03-04 NOTE — TELEPHONE ENCOUNTER
Pt states his pharmacy does not have the antibiotic rx that was to be sent over yesterday - 2 more days worth

## 2022-03-04 NOTE — TELEPHONE ENCOUNTER
Per pt needs an antibiotic called in to upgrade pharmacy. Per pt he will be out in Sunday.  Please advise

## 2022-03-04 NOTE — TELEPHONE ENCOUNTER
Im wasn't working today but appears antibiotics were given to patient in ER for urinary symptoms,patient had a urological procedure recently

## 2022-03-09 ENCOUNTER — NURSE ONLY (OUTPATIENT)
Dept: SURGERY | Facility: CLINIC | Age: 67
End: 2022-03-09
Payer: MEDICAID

## 2022-03-09 VITALS — DIASTOLIC BLOOD PRESSURE: 92 MMHG | HEART RATE: 83 BPM | SYSTOLIC BLOOD PRESSURE: 135 MMHG

## 2022-03-09 DIAGNOSIS — R30.0 DYSURIA: Primary | ICD-10-CM

## 2022-03-09 LAB
BILIRUB UR QL: NEGATIVE
CLARITY UR: CLEAR
COLOR UR: YELLOW
GLUCOSE UR-MCNC: NEGATIVE MG/DL
KETONES UR-MCNC: NEGATIVE MG/DL
LEUKOCYTE ESTERASE UR QL STRIP.AUTO: NEGATIVE
NITRITE UR QL STRIP.AUTO: NEGATIVE
PH UR: 6 [PH] (ref 5–8)
PROT UR-MCNC: NEGATIVE MG/DL
SP GR UR STRIP: 1.01 (ref 1–1.03)
UROBILINOGEN UR STRIP-ACNC: <2
VIT C UR-MCNC: NEGATIVE MG/DL

## 2022-03-09 PROCEDURE — 3075F SYST BP GE 130 - 139MM HG: CPT | Performed by: UROLOGY

## 2022-03-09 PROCEDURE — 3080F DIAST BP >= 90 MM HG: CPT | Performed by: UROLOGY

## 2022-03-09 PROCEDURE — 51700 IRRIGATION OF BLADDER: CPT | Performed by: UROLOGY

## 2022-03-09 NOTE — PROGRESS NOTES
0- Pt came back to office instead of calling. States last week when he called he never received call back so it was easier for him to come to office. Pt denies bladder pain/pressure. States he has been urinating but in small amounts. Pt was able to urinate in the office and afterwards was bladder scanned. PVR showed 205 ml. Results given to [facility] and notified of burning sensation as well. [facility] recommends pt come back on Friday morning for another bladder scan, UA with culture reflex to be sent out today, and for pt to continue taking AZO prn for burning discomfort. Pt given these instructions, verbalized understanding. NV scheduled for Friday 3/11 at 0820 for bladder scan.

## 2022-03-09 NOTE — PROGRESS NOTES
Pt here today for voiding trial/decath. Pt upset because he had discomfort with patrick catheter and states he had burning while catheter tube in. Urine culture obtained last week showed no growth no indication for infection. Let pt know that based on this no further antibiotics were prescribed. Also states he thought there was infection at the head of penis. No redness, discharge, inflammation, or discoloration noted. Pt also states he was having issues with constipation and was asking if antibiotics cause constipation. I let him know that usually antibiotics cause GI upset/diarrhea instead of constipation. Asked pt if he has been taking AZO prn for discomfort. States he has but does not feel it is working. Instructed pt to continue this medicine prn especially now that catheter is out because he may experience burning with catheter removal.  Pt placed self onto exam table with pants lowered. Removed stat lock with alcohol wipe. Aspirated 9 ml from patrick balloon. Instilled normal saline into bladder until pt felt strong urge to urinate at 100ml. Pt was then instructed to take deep breath and catheter was removed without complications. Instructed pt to drink no more than 1 cup every 2 hours to prevent the bladder from becoming overloaded and to monitor urine output. Also instructed pt to call office around 3 pm for condition update. Verbalized understanding.

## 2022-03-11 ENCOUNTER — NURSE ONLY (OUTPATIENT)
Dept: SURGERY | Facility: CLINIC | Age: 67
End: 2022-03-11
Payer: MEDICAID

## 2022-03-11 VITALS — DIASTOLIC BLOOD PRESSURE: 79 MMHG | HEART RATE: 81 BPM | RESPIRATION RATE: 16 BRPM | SYSTOLIC BLOOD PRESSURE: 121 MMHG

## 2022-03-11 DIAGNOSIS — R33.9 URINARY RETENTION: Primary | ICD-10-CM

## 2022-03-11 PROCEDURE — 3074F SYST BP LT 130 MM HG: CPT | Performed by: UROLOGY

## 2022-03-11 PROCEDURE — 3078F DIAST BP <80 MM HG: CPT | Performed by: UROLOGY

## 2022-03-11 PROCEDURE — 51798 US URINE CAPACITY MEASURE: CPT | Performed by: UROLOGY

## 2022-03-11 NOTE — PROGRESS NOTES
I called pt into the exam room and introduced myself and I verified his name and . I explained that I will be performing a PVR bladder scan and I asked if he could please empty his bladder before I do the scan. Pt did this and I then assisted him onto the exam table and asked him to lower his bottom clothing a little so that I could access his lower abdomen. I performed the scan and noted twice a retained urine amt of 67 ml. I told pt that this was acceptable and I assisted him to clean up and get off the exam table and I told him to make sure to keep his bowels soft so that he has a daily BM, to avoid antihistamines and decongestants if possible and call the office if he should start urinating small amts frequently or no urine at all for a 6 or more hrs. I also instructed him to otherwise keep his f/u appt. Pt verbalized understanding and compliance.

## 2022-04-01 ENCOUNTER — TELEPHONE (OUTPATIENT)
Dept: SURGERY | Facility: CLINIC | Age: 67
End: 2022-04-01

## 2022-04-01 NOTE — TELEPHONE ENCOUNTER
Pt called and confirmed office cysto at [facility] pt needs to arrive at 7:30am for his 8am cysto.

## 2022-04-01 NOTE — TELEPHONE ENCOUNTER
Please review. Protocol failed / No protocol. Requested Prescriptions   Pending Prescriptions Disp Refills    TAB-A-MARY Oral Tab [Pharmacy Med Name: TAB-A-MARY ORAL TABLET] 90 tablet 0     Sig: TAKE 1 TABLET BY MOUTH DAILY.         There is no refill protocol information for this order           Future Appointments         Provider Department Appt Notes    In 3 days Gene Wakefield MD [facility], [address] cystoscopy( rescheduled-GC)    In 2 months Shwetha Heart MD [facility] FOLLOW Up -3 mo            Recent Outpatient Visits              4 days ago Malignant neoplasm of prostate [facility])    Radiation Oncology - Jeff Bronson MD    Office Visit    3 weeks ago Urinary retention    [facility], [address], [place]    Nurse Only    3 weeks ago [facility], [address], [place]    Nurse Only    1 month ago [facility], [address], [place]    Nurse Only    3 months ago [facility] hematuria    [facility] for Dana Bautista MD    Office Visit

## 2022-04-02 RX ORDER — MULTIVITAMIN WITH FOLIC ACID 400 MCG
1 TABLET ORAL DAILY
Qty: 90 TABLET | Refills: 0 | OUTPATIENT
Start: 2022-04-02

## 2022-04-04 ENCOUNTER — TELEPHONE (OUTPATIENT)
Dept: SURGERY | Facility: CLINIC | Age: 67
End: 2022-04-04

## 2022-04-04 ENCOUNTER — PROCEDURE (OUTPATIENT)
Dept: SURGERY | Facility: CLINIC | Age: 67
End: 2022-04-04
Payer: MEDICAID

## 2022-04-04 ENCOUNTER — EXTERNAL RECORD (OUTPATIENT)
Dept: HEALTH INFORMATION MANAGEMENT | Facility: OTHER | Age: 67
End: 2022-04-04

## 2022-04-04 VITALS — DIASTOLIC BLOOD PRESSURE: 81 MMHG | SYSTOLIC BLOOD PRESSURE: 124 MMHG | HEART RATE: 65 BPM

## 2022-04-04 DIAGNOSIS — N13.8 BPH WITH OBSTRUCTION/LOWER URINARY TRACT SYMPTOMS: ICD-10-CM

## 2022-04-04 DIAGNOSIS — Z92.3 HISTORY OF BRACHYTHERAPY: ICD-10-CM

## 2022-04-04 DIAGNOSIS — R31.0 GROSS HEMATURIA: Primary | ICD-10-CM

## 2022-04-04 DIAGNOSIS — N40.1 BPH WITH OBSTRUCTION/LOWER URINARY TRACT SYMPTOMS: ICD-10-CM

## 2022-04-04 DIAGNOSIS — C61 PROSTATE CANCER (HCC): ICD-10-CM

## 2022-04-04 PROCEDURE — 3074F SYST BP LT 130 MM HG: CPT | Performed by: UROLOGY

## 2022-04-04 PROCEDURE — 99213 OFFICE O/P EST LOW 20 MIN: CPT | Performed by: UROLOGY

## 2022-04-04 PROCEDURE — 52000 CYSTOURETHROSCOPY: CPT | Performed by: UROLOGY

## 2022-04-04 PROCEDURE — 3079F DIAST BP 80-89 MM HG: CPT | Performed by: UROLOGY

## 2022-04-04 RX ORDER — CIPROFLOXACIN 500 MG/1
500 TABLET, FILM COATED ORAL ONCE
Status: COMPLETED | OUTPATIENT
Start: 2022-04-04 | End: 2022-04-04

## 2022-04-04 RX ADMIN — CIPROFLOXACIN 500 MG: 500 TABLET, FILM COATED ORAL at 09:29:00

## 2022-04-04 NOTE — TELEPHONE ENCOUNTER
My chart message sent to pt reminder to have prostate us before next meryl with Dr. JEROME OROSCO [facility] Alert/Awake/Cooperative

## 2022-04-04 NOTE — PROGRESS NOTES
[facility], [facility] Urology  Follow-Up Visit    HPI: Sherley Salgado is a 61year old male presents for a follow up visit. Patient was last seen on 12/22/21. Here by himself. INTERVAL HISTORY: Prostate cancer diagnosed 2/2018. underwent brachytherapy monotherapy seed implant at the [facility] 9/2020. PSA easton 1.01 ng/mL 12/2021. PSA 2/19/22 was 1.28 ng/mL. He denies bone pain or unintentional weight loss. Patient with significant worsening LUTS following brachytherapy. He reports dysuria, frequency, sensation incomplete bladder emptying, weak stream, nocturia. He also reports a few incidents of gross hematuria. No evidence of UTI urine cultures. CT urogram 1/19/2022 without any significant urological findings except for some bladder thickening. Urine cytology was benign. Treated with a Medrol Dosepak by radiation oncology as well as a few courses of antibiotics. Reports minimal improvement in symptoms. Episode of urinary retention 2/2022. Varghese catheter inserted and subsequently removed 3/9/2022.  2267 mL. He has been on Flomax for quite a while. He reports his symptoms are somewhat better however still there. No more gross hematuria. 1. Unfavorable Intermediate Risk Prostate Cancer (cT1c Nx Mx)  Initially diagnosed in 02/2018 on TRUS-guided prostate biopsy performed by Dr. Romulo Anderson for an elevated screening PSA level of 4.5 ng/mL. Pathology revealed prostate cancer, GG 4+3=7 in 1/12 cores [Lt. Mid] with 20% core involvement. Patient was initiated on ADT with bicalutamide and Eligard by Dr. Romulo Anderson for some unclear reasons. I stopped ADT when I saw him in 08/2018 and urged him to make a final decision regarding definitive local treatment. Recent PSA level in 06/2019 was up to 7.78 ng/mL. Had a multi-parametric MRI of the prostate in 09/2019 which revealed organ confined disease.  Met with Dr. Bard Avila from radiation oncology in 9/2019 before the MRI however has not followed up with him since then. Completed brachytherapy seed implants at [address] 9/2020 with Dr. Kimmy Melendez. - 1/2021 F/U: PSA down to 2.52 ng/mL. Bladder scan 48 mL.     - 4/2021 F/U: PSA 1.7 ng/mL. On flomax for BPH with LUTS. IPSS 27.  Interested in learning about MIST's.    - 12/2021 F/U: PSA 1.01 ng/mL. On Flomax for BPH. Stable voiding symptoms. PVR 36 mL. Having gross hematuria and dysuria. Gross hematuria work-up. Empiric Bactrim DS for 7 days. - 3/2022 F/U: CT urogram without significant findings. Cytology benign. Had urinary retention 3/1/22 in setting of constipation. Varghese removed 3/9. Significant LUTS, on Flomax. Cysto with mod BPH + lateral lobes, no strictures. F/U for uroflow. Get prostate ultrasound to r/o prostate abscess. SOCIAL Hx: Patient is . He denies smoking. He is a recovering alcoholic. He is a retired . Reviewed past medical, surgical, family, and social history. Reviewed med list and allergies. REVIEW OF SYSTEMS:  Pertinent positives and negatives per HPI. A 10-point ROS was performed and is otherwise negative. EXAM:  /81 (BP Location: Left arm, Patient Position: Sitting, Cuff Size: large)   Pulse 65      Physical Exam    Constitutional: He is oriented to person, place, and time. He appears well-developed. No distress. HENT:   Head: Normocephalic. Eyes: No scleral icterus. Cardiovascular: Normal rate. Pulmonary/Chest: Effort normal.   Abdominal: Soft. He exhibits no distension. There is no abdominal tenderness. Genitourinary: Prostate is not enlarged. Genitourinary Comments: JULIANA not performed today however on 4/20/2021 and revealed enlarged prostate, 40 g, no nodules. Nontender. Musculoskeletal: Normal range of motion. Neurological: He is alert and oriented to person, place, and time. Skin: Skin is warm and dry. Psychiatric: He has a normal mood and affect.  His behavior is normal.       PATHOLOGY:    TRUS-Guided Prostate Biopsy (02/2018): Prostate cancer, GG 4+3=7 in 1/12 cores (Lt. Mid), 20% core involvement. LABS:    Component PSA   Latest Ref Rng & Units <=4.00 ng/mL   2/19/2022 1.28   12/21/2021 1.01   4/16/2021 1.70   1/13/2021 2.52   6/19/2019 7.78 (H)   11/17/2017 4.5 (H)   5/25/2016 3.6       IMAGING:    MP-MRI Prostate (09/2019): PI-RADS CLASSIFICATION:  PI-RADS 4 - High (clinically significant cancer is likely to be present). Ill-defined signal changes involving the left posteromedial peripheral zone base near the origin of the left seminal vesicle, which is asymmetrically atretic. This may correspond to area of biopsy-proven [place] cancer. The asymmetric atresia of the left seminal vesicle could be on the basis of prior seminal vesiculitis, although raises the possibility of extracapsular extension. No pelvic lymphadenopathy. Mildly elevated PSA density. Prostate volume 43 cc. UROLOGY PROCEDURE:  CYSTOURETHROSCOPY    Informed consent was obtained for the procedure. The site was prepped and draped in the usual sterile fashion. An Olympus 16 Western Susu digital flexible cystoscope was utilized for the procedure. Anesthesia:  2% lidocaine gel    Urethra: Normal without strictures. Prostate / Pelvic: Abnormal 2+ enlarged with lateral lobe hypertrophy. Mild median lobe enlargement. Friable prostatic tissue consistent with history of brachytherapy. Bladder: Normal.  No tumor, stone, diverticulum, or glomerulation    U.O's: Normal    Trabeculation: +1    POST CYSTOSCOPY MEDICATIONS: sample one tablet Cipro 500 mg given to patient    DIAGNOSIS: enlarged prostate with bladder outlet obstruction. IMPRESSION:  61year old male who was diagnosed with low volume unfavorable intermediate risk prostate cancer in 02/2018 upon prostate biopsy for elevated screening PSA level by Dr. Matilde Julio. Patient did not pursue active treatment initially. Completed brachytherapy seed implants with Dr. Willie White 9/2020.  No ADT.     Acceptable biochemical response thus far. Follows up with rad oncology. Discussed prostate cancer surveillance per NCCN guidelines. LUTS significantly worsened after brachytherapy, persistent despite being on Flomax. Patient with an episode of gross hematuria and associated dysuria. No signs of UTI. Evaluated with cytology, CT urogram which were essentially unrevealing. Cystoscopy today without any significant findings except for BPH and friable prostatic tissue. Results discussed with patient at length. Management options discussed including continued medical therapy with Flomax versus considering minimally invasive surgical treatments for BPH such as UroLift or bipolar TURP. Handouts provided. I also recommended obtaining a prostate ultrasound to rule out a prostate abscess following brachytherapy. Patient interested in learning more about no invasive surgical treatments. He will get the prostate ultrasound and follow-up in a few weeks for uroflowmetry and bladder scan/PVR at which time we will further discuss surgical management options. All questions answered. PLAN:  1. Prostate cancer surveillance per NCCN guidelines. 2. Continue Flomax for BPH with LUTS. 3. Prostate ultrasound to rule out prostate abscess. RTC for follow-up in 3 to 4 weeks for uroflow and PVR and to discuss further MIST options.       Sulema Laura MD  4/4/2022

## 2022-05-18 NOTE — ED INITIAL ASSESSMENT (HPI)
Pt presents to the ER with c/o shakiness and insomnia. Pt reports he has been drink 12 pack of beer everyday x 4 weeks. He admits to drinking today.      Would like help to stop drinking

## 2022-06-23 ENCOUNTER — TELEPHONE (OUTPATIENT)
Dept: SURGERY | Facility: CLINIC | Age: 67
End: 2022-06-23

## 2022-06-23 ENCOUNTER — PATIENT OUTREACH (OUTPATIENT)
Dept: CASE MANAGEMENT | Age: 67
End: 2022-06-23

## 2022-06-23 NOTE — TELEPHONE ENCOUNTER
Per Maria Fernanda,  with [address], please contact pt to schedule follow up after ED visit.  Please advise

## 2022-06-23 NOTE — PROGRESS NOTES
VM received; pt requesting assistance w/scheduling apt (dc 05/18)    Dr Deng Zhu  [facility] [address]  [facility]  [phone]  Apt made: Wed 06/30 @3:00pm    Dr Yamileth Sharma  Urology; Urologic Oncology  PATIENTS' [place] [address] [address]  [address] [facility]  [phone]  Dr Nguyen's office will call pt, due to no availability in needed time frame  Confirmed w/pt  Closing encounter

## 2022-06-25 ENCOUNTER — LAB ENCOUNTER (OUTPATIENT)
Dept: LAB | Age: 67
End: 2022-06-25
Attending: RADIOLOGY
Payer: MEDICAID

## 2022-06-25 DIAGNOSIS — C61 MALIGNANT NEOPLASM OF PROSTATE (HCC): ICD-10-CM

## 2022-06-25 LAB — PSA SERPL-MCNC: 0.86 NG/ML (ref ?–4)

## 2022-06-25 PROCEDURE — 84153 ASSAY OF PSA TOTAL: CPT

## 2022-06-25 PROCEDURE — 36415 COLL VENOUS BLD VENIPUNCTURE: CPT

## 2022-07-19 RX ORDER — CLOBETASOL PROPIONATE 0.5 MG/G
1 CREAM TOPICAL 2 TIMES DAILY
Qty: 60 G | Refills: 0 | OUTPATIENT
Start: 2022-07-19 | End: 2023-07-19

## 2022-07-21 RX ORDER — FOLIC ACID 1 MG/1
1 TABLET ORAL DAILY
Qty: 90 TABLET | Refills: 3 | OUTPATIENT
Start: 2022-07-21

## 2022-07-21 NOTE — TELEPHONE ENCOUNTER
1st attempt/left voice mail message for the patient to call back. Please, see message below when the call is returned.

## 2022-07-25 NOTE — TELEPHONE ENCOUNTER
2nd attempt - Lotus Carst message sent for patient to contact the office to schedule an appointment; see notes below.

## 2022-08-27 ENCOUNTER — APPOINTMENT (OUTPATIENT)
Dept: GENERAL RADIOLOGY | Age: 67
End: 2022-08-27
Attending: NURSE PRACTITIONER
Payer: MEDICARE

## 2022-08-27 ENCOUNTER — HOSPITAL ENCOUNTER (OUTPATIENT)
Age: 67
Discharge: HOME | End: 2022-08-27
Payer: MEDICARE

## 2022-08-27 VITALS
HEIGHT: 65 IN | BODY MASS INDEX: 23.32 KG/M2 | TEMPERATURE: 98 F | WEIGHT: 140 LBS | SYSTOLIC BLOOD PRESSURE: 133 MMHG | RESPIRATION RATE: 18 BRPM | DIASTOLIC BLOOD PRESSURE: 94 MMHG | HEART RATE: 80 BPM | OXYGEN SATURATION: 97 %

## 2022-08-27 DIAGNOSIS — J06.9 VIRAL URI WITH COUGH: Primary | ICD-10-CM

## 2022-08-27 LAB
S PYO AG THROAT QL: NEGATIVE
SARS-COV-2 RNA RESP QL NAA+PROBE: NOT DETECTED

## 2022-08-27 PROCEDURE — 71046 X-RAY EXAM CHEST 2 VIEWS: CPT | Performed by: NURSE PRACTITIONER

## 2022-08-27 PROCEDURE — 99213 OFFICE O/P EST LOW 20 MIN: CPT | Performed by: NURSE PRACTITIONER

## 2022-08-27 PROCEDURE — U0002 COVID-19 LAB TEST NON-CDC: HCPCS | Performed by: NURSE PRACTITIONER

## 2022-08-27 PROCEDURE — 87880 STREP A ASSAY W/OPTIC: CPT | Performed by: NURSE PRACTITIONER

## 2022-08-27 RX ORDER — BENZONATATE 100 MG/1
100 CAPSULE ORAL 3 TIMES DAILY PRN
Qty: 30 CAPSULE | Refills: 0 | Status: SHIPPED | OUTPATIENT
Start: 2022-08-27 | End: 2022-09-26

## 2022-08-27 RX ORDER — CLOBETASOL PROPIONATE 0.5 MG/G
1 CREAM TOPICAL 2 TIMES DAILY
Qty: 60 G | Refills: 0 | OUTPATIENT
Start: 2022-08-27 | End: 2023-08-27

## 2022-08-27 NOTE — ED INITIAL ASSESSMENT (HPI)
Patient presents with complaints of cough and sore throat for the last two days. Denies use of otc medications for complaints. Denies home covid testing or exposure to covid positive persons. Pulmonology

## 2022-12-14 ENCOUNTER — LAB ENCOUNTER (OUTPATIENT)
Dept: LAB | Age: 67
End: 2022-12-14
Attending: RADIOLOGY
Payer: MEDICARE

## 2022-12-14 DIAGNOSIS — C61 MALIGNANT NEOPLASM OF PROSTATE (HCC): Primary | ICD-10-CM

## 2022-12-14 LAB — PSA SERPL-MCNC: 1.85 NG/ML (ref ?–4)

## 2022-12-14 PROCEDURE — 84153 ASSAY OF PSA TOTAL: CPT

## 2022-12-14 PROCEDURE — 36415 COLL VENOUS BLD VENIPUNCTURE: CPT

## 2023-02-06 ENCOUNTER — TELEPHONE (OUTPATIENT)
Dept: FAMILY MEDICINE CLINIC | Facility: CLINIC | Age: 68
End: 2023-02-06

## 2023-02-06 ENCOUNTER — LAB ENCOUNTER (OUTPATIENT)
Dept: LAB | Age: 68
End: 2023-02-06
Attending: FAMILY MEDICINE
Payer: MEDICARE

## 2023-02-06 DIAGNOSIS — Z00.00 ENCOUNTER FOR ANNUAL HEALTH EXAMINATION: Primary | ICD-10-CM

## 2023-02-06 DIAGNOSIS — Z12.5 ENCOUNTER FOR SCREENING FOR MALIGNANT NEOPLASM OF PROSTATE: ICD-10-CM

## 2023-02-06 LAB
ALBUMIN SERPL-MCNC: 3.6 G/DL (ref 3.4–5)
ALBUMIN/GLOB SERPL: 1.1 {RATIO} (ref 1–2)
ALP LIVER SERPL-CCNC: 66 U/L
ALT SERPL-CCNC: 22 U/L
ANION GAP SERPL CALC-SCNC: 9 MMOL/L (ref 0–18)
AST SERPL-CCNC: 19 U/L (ref 15–37)
BASOPHILS # BLD AUTO: 0.04 X10(3) UL (ref 0–0.2)
BASOPHILS NFR BLD AUTO: 0.8 %
BILIRUB SERPL-MCNC: 0.4 MG/DL (ref 0.1–2)
BUN BLD-MCNC: 17 MG/DL (ref 7–18)
BUN/CREAT SERPL: 23 (ref 10–20)
CALCIUM BLD-MCNC: 9.3 MG/DL (ref 8.5–10.1)
CHLORIDE SERPL-SCNC: 111 MMOL/L (ref 98–112)
CHOLEST SERPL-MCNC: 213 MG/DL (ref ?–200)
CO2 SERPL-SCNC: 23 MMOL/L (ref 21–32)
CREAT BLD-MCNC: 0.74 MG/DL
DEPRECATED RDW RBC AUTO: 43.4 FL (ref 35.1–46.3)
EOSINOPHIL # BLD AUTO: 0.07 X10(3) UL (ref 0–0.7)
EOSINOPHIL NFR BLD AUTO: 1.3 %
ERYTHROCYTE [DISTWIDTH] IN BLOOD BY AUTOMATED COUNT: 13.2 % (ref 11–15)
EST. AVERAGE GLUCOSE BLD GHB EST-MCNC: 103 MG/DL (ref 68–126)
FASTING PATIENT LIPID ANSWER: YES
FASTING STATUS PATIENT QL REPORTED: YES
GFR SERPLBLD BASED ON 1.73 SQ M-ARVRAT: 101 ML/MIN/1.73M2 (ref 60–?)
GLOBULIN PLAS-MCNC: 3.4 G/DL (ref 2.8–4.4)
GLUCOSE BLD-MCNC: 110 MG/DL (ref 70–99)
HBA1C MFR BLD: 5.2 % (ref ?–5.7)
HCT VFR BLD AUTO: 42.8 %
HDLC SERPL-MCNC: 64 MG/DL (ref 40–59)
HGB BLD-MCNC: 14.7 G/DL
IMM GRANULOCYTES # BLD AUTO: 0.01 X10(3) UL (ref 0–1)
IMM GRANULOCYTES NFR BLD: 0.2 %
LDLC SERPL CALC-MCNC: 141 MG/DL (ref ?–100)
LYMPHOCYTES # BLD AUTO: 1.39 X10(3) UL (ref 1–4)
LYMPHOCYTES NFR BLD AUTO: 26.4 %
MCH RBC QN AUTO: 31 PG (ref 26–34)
MCHC RBC AUTO-ENTMCNC: 34.3 G/DL (ref 31–37)
MCV RBC AUTO: 90.3 FL
MONOCYTES # BLD AUTO: 0.39 X10(3) UL (ref 0.1–1)
MONOCYTES NFR BLD AUTO: 7.4 %
NEUTROPHILS # BLD AUTO: 3.37 X10 (3) UL (ref 1.5–7.7)
NEUTROPHILS # BLD AUTO: 3.37 X10(3) UL (ref 1.5–7.7)
NEUTROPHILS NFR BLD AUTO: 63.9 %
NONHDLC SERPL-MCNC: 149 MG/DL (ref ?–130)
OSMOLALITY SERPL CALC.SUM OF ELEC: 298 MOSM/KG (ref 275–295)
PLATELET # BLD AUTO: 252 10(3)UL (ref 150–450)
POTASSIUM SERPL-SCNC: 3.9 MMOL/L (ref 3.5–5.1)
PROT SERPL-MCNC: 7 G/DL (ref 6.4–8.2)
RBC # BLD AUTO: 4.74 X10(6)UL
SODIUM SERPL-SCNC: 143 MMOL/L (ref 136–145)
TRIGL SERPL-MCNC: 47 MG/DL (ref 30–149)
TSI SER-ACNC: 3.13 MIU/ML (ref 0.36–3.74)
VLDLC SERPL CALC-MCNC: 9 MG/DL (ref 0–30)
WBC # BLD AUTO: 5.3 X10(3) UL (ref 4–11)

## 2023-02-06 PROCEDURE — 80061 LIPID PANEL: CPT | Performed by: FAMILY MEDICINE

## 2023-02-06 PROCEDURE — 83036 HEMOGLOBIN GLYCOSYLATED A1C: CPT | Performed by: FAMILY MEDICINE

## 2023-02-06 PROCEDURE — 36415 COLL VENOUS BLD VENIPUNCTURE: CPT | Performed by: FAMILY MEDICINE

## 2023-02-06 PROCEDURE — 84443 ASSAY THYROID STIM HORMONE: CPT | Performed by: FAMILY MEDICINE

## 2023-02-06 PROCEDURE — 80053 COMPREHEN METABOLIC PANEL: CPT | Performed by: FAMILY MEDICINE

## 2023-02-06 PROCEDURE — 85025 COMPLETE CBC W/AUTO DIFF WBC: CPT | Performed by: FAMILY MEDICINE

## 2023-03-06 ENCOUNTER — OFFICE VISIT (OUTPATIENT)
Dept: FAMILY MEDICINE CLINIC | Facility: CLINIC | Age: 68
End: 2023-03-06

## 2023-03-06 VITALS
WEIGHT: 148 LBS | DIASTOLIC BLOOD PRESSURE: 81 MMHG | HEART RATE: 60 BPM | BODY MASS INDEX: 25 KG/M2 | TEMPERATURE: 98 F | SYSTOLIC BLOOD PRESSURE: 124 MMHG

## 2023-03-06 DIAGNOSIS — D69.6 THROMBOCYTOPENIA (HCC): ICD-10-CM

## 2023-03-06 DIAGNOSIS — Z23 NEED FOR SHINGLES VACCINE: ICD-10-CM

## 2023-03-06 DIAGNOSIS — C61 PROSTATE CANCER (HCC): ICD-10-CM

## 2023-03-06 DIAGNOSIS — F10.282 ALCOHOL DEPENDENCE WITH ALCOHOL-INDUCED SLEEP DISORDER (HCC): ICD-10-CM

## 2023-03-06 DIAGNOSIS — M54.50 CHRONIC BILATERAL LOW BACK PAIN WITHOUT SCIATICA: ICD-10-CM

## 2023-03-06 DIAGNOSIS — E78.00 HYPERCHOLESTEREMIA: ICD-10-CM

## 2023-03-06 DIAGNOSIS — F10.21 ALCOHOLISM IN REMISSION (HCC): ICD-10-CM

## 2023-03-06 DIAGNOSIS — R74.8 ELEVATED LIVER ENZYMES: ICD-10-CM

## 2023-03-06 DIAGNOSIS — Z23 NEED FOR PNEUMOCOCCAL VACCINE: ICD-10-CM

## 2023-03-06 DIAGNOSIS — M21.619 BUNION OF GREAT TOE: ICD-10-CM

## 2023-03-06 DIAGNOSIS — G89.29 CHRONIC BILATERAL LOW BACK PAIN WITHOUT SCIATICA: ICD-10-CM

## 2023-03-06 DIAGNOSIS — R94.6 BORDERLINE ABNORMAL THYROID FUNCTION TEST: ICD-10-CM

## 2023-03-06 DIAGNOSIS — Z00.00 ENCOUNTER FOR ANNUAL HEALTH EXAMINATION: Primary | ICD-10-CM

## 2023-03-06 DIAGNOSIS — Z12.11 COLON CANCER SCREENING: ICD-10-CM

## 2023-03-06 PROBLEM — R97.20 ELEVATED PSA: Status: RESOLVED | Noted: 2017-11-18 | Resolved: 2023-03-06

## 2023-03-06 PROBLEM — M79.646 THUMB PAIN: Status: RESOLVED | Noted: 2020-02-10 | Resolved: 2023-03-06

## 2023-03-06 PROBLEM — R79.89 ABNORMAL LFTS: Status: RESOLVED | Noted: 2019-11-04 | Resolved: 2023-03-06

## 2023-03-06 RX ORDER — ZOSTER VACCINE RECOMBINANT, ADJUVANTED 50 MCG/0.5
0.5 KIT INTRAMUSCULAR ONCE
Qty: 1 EACH | Refills: 0 | Status: SHIPPED | OUTPATIENT
Start: 2023-03-06 | End: 2023-03-06

## 2023-03-13 ENCOUNTER — LAB ENCOUNTER (OUTPATIENT)
Dept: LAB | Age: 68
End: 2023-03-13
Attending: RADIOLOGY
Payer: MEDICARE

## 2023-03-13 DIAGNOSIS — C61 MALIGNANT NEOPLASM OF PROSTATE (HCC): Primary | ICD-10-CM

## 2023-03-13 LAB — PSA SERPL-MCNC: 1.07 NG/ML (ref ?–4)

## 2023-03-13 PROCEDURE — 36415 COLL VENOUS BLD VENIPUNCTURE: CPT

## 2023-03-13 PROCEDURE — 84153 ASSAY OF PSA TOTAL: CPT

## 2023-08-10 ENCOUNTER — NURSE TRIAGE (OUTPATIENT)
Dept: FAMILY MEDICINE CLINIC | Facility: CLINIC | Age: 68
End: 2023-08-10

## 2023-08-10 ENCOUNTER — HOSPITAL ENCOUNTER (OUTPATIENT)
Age: 68
Discharge: ANOTHER HOSPITAL | End: 2023-08-10
Payer: MEDICARE

## 2023-08-10 VITALS
SYSTOLIC BLOOD PRESSURE: 128 MMHG | RESPIRATION RATE: 18 BRPM | BODY MASS INDEX: 23.05 KG/M2 | HEIGHT: 64 IN | OXYGEN SATURATION: 96 % | WEIGHT: 135 LBS | HEART RATE: 68 BPM | DIASTOLIC BLOOD PRESSURE: 94 MMHG | TEMPERATURE: 99 F

## 2023-08-10 DIAGNOSIS — F10.939 ALCOHOL WITHDRAWAL SYNDROME WITH COMPLICATION (HCC): Primary | ICD-10-CM

## 2023-08-10 PROCEDURE — 99214 OFFICE O/P EST MOD 30 MIN: CPT | Performed by: NURSE PRACTITIONER

## 2023-08-10 NOTE — TELEPHONE ENCOUNTER
Pt came into the office wanting to see Jazmin Sam due to difficulty sleeping for the last few nights. Did not want to make an appointment at the time.  Please follow up with pt.    [phone]

## 2023-09-21 ENCOUNTER — LAB ENCOUNTER (OUTPATIENT)
Dept: LAB | Age: 68
End: 2023-09-21
Attending: RADIOLOGY
Payer: MEDICARE

## 2023-09-21 DIAGNOSIS — C61 MALIGNANT NEOPLASM OF PROSTATE (HCC): Primary | ICD-10-CM

## 2023-09-21 LAB — PSA SERPL-MCNC: 0.44 NG/ML (ref ?–4)

## 2023-09-21 PROCEDURE — 84153 ASSAY OF PSA TOTAL: CPT

## 2023-09-21 PROCEDURE — 36415 COLL VENOUS BLD VENIPUNCTURE: CPT

## 2023-10-02 ENCOUNTER — APPOINTMENT (OUTPATIENT)
Dept: GENERAL RADIOLOGY | Age: 68
End: 2023-10-02
Attending: NURSE PRACTITIONER
Payer: MEDICARE

## 2023-10-02 ENCOUNTER — HOSPITAL ENCOUNTER (OUTPATIENT)
Age: 68
Discharge: HOME | End: 2023-10-02
Payer: MEDICARE

## 2023-10-02 VITALS
OXYGEN SATURATION: 97 % | HEART RATE: 85 BPM | TEMPERATURE: 98 F | RESPIRATION RATE: 18 BRPM | DIASTOLIC BLOOD PRESSURE: 82 MMHG | SYSTOLIC BLOOD PRESSURE: 124 MMHG

## 2023-10-02 DIAGNOSIS — S92.422A CLOSED DISPLACED FRACTURE OF DISTAL PHALANX OF LEFT GREAT TOE, INITIAL ENCOUNTER: Primary | ICD-10-CM

## 2023-10-02 PROCEDURE — 73630 X-RAY EXAM OF FOOT: CPT | Performed by: NURSE PRACTITIONER

## 2023-10-02 PROCEDURE — 99213 OFFICE O/P EST LOW 20 MIN: CPT | Performed by: NURSE PRACTITIONER

## 2023-10-02 NOTE — DISCHARGE INSTRUCTIONS
Rest from exacerbating activities for the next week. Apply ice/cold compress for 20min at a time 4-6x daily for the next 2-3 days. Keep the left foot elevated when resting as much as possible. You may take Motrin every 6 hours as needed for pain. Take this with food. If additional pain control is needed, you may also take Tylenol every 6 hours. Follow up with your primary provider or the foot specialist provided in your discharge instructions in the next 2-3 days. Seek additional care in the ER for new or worsening symptoms, fever or increasing pain.

## 2024-08-30 ENCOUNTER — WALK IN (OUTPATIENT)
Dept: URGENT CARE | Age: 69
End: 2024-08-30
Attending: FAMILY MEDICINE

## 2024-08-30 VITALS
BODY MASS INDEX: 23.32 KG/M2 | DIASTOLIC BLOOD PRESSURE: 82 MMHG | HEIGHT: 65 IN | OXYGEN SATURATION: 97 % | WEIGHT: 140 LBS | RESPIRATION RATE: 16 BRPM | TEMPERATURE: 98 F | HEART RATE: 83 BPM | SYSTOLIC BLOOD PRESSURE: 125 MMHG

## 2024-08-30 DIAGNOSIS — M62.82 NON-TRAUMATIC RHABDOMYOLYSIS: Primary | ICD-10-CM

## 2024-08-30 DIAGNOSIS — R82.998 DARK URINE: ICD-10-CM

## 2024-08-30 DIAGNOSIS — L30.9 DERMATITIS: ICD-10-CM

## 2024-08-30 LAB
APPEARANCE, POC: CLEAR
BILIRUBIN, POC: NEGATIVE
COLOR, POC: YELLOW
GLUCOSE UR-MCNC: NEGATIVE MG/DL
INTERNAL PROCEDURAL CONTROLS ACCEPTABLE: YES
KETONES, POC: ABNORMAL MG/DL
NITRITE, POC: NEGATIVE
OCCULT BLOOD, POC: NEGATIVE
PH UR: 5.5 [PH] (ref 5–7)
PROT UR-MCNC: NEGATIVE MG/DL
SP GR UR: <= 1.005 (ref 1–1.03)
TEST LOT EXPIRATION DATE: ABNORMAL
TEST LOT NUMBER: ABNORMAL
UROBILINOGEN UR-MCNC: 0.2 MG/DL (ref 0–1)
WBC (LEUKOCYTE) ESTERASE, POC: NEGATIVE

## 2024-08-30 PROCEDURE — 81002 URINALYSIS NONAUTO W/O SCOPE: CPT | Performed by: FAMILY MEDICINE

## 2024-08-30 RX ORDER — TAMSULOSIN HYDROCHLORIDE 0.4 MG/1
1 CAPSULE ORAL EVERY 24 HOURS
COMMUNITY

## 2024-08-30 RX ORDER — CLOTRIMAZOLE AND BETAMETHASONE DIPROPIONATE 10; .64 MG/G; MG/G
1 CREAM TOPICAL 2 TIMES DAILY
Qty: 30 G | Refills: 0 | Status: SHIPPED | OUTPATIENT
Start: 2024-08-30 | End: 2024-09-13

## 2024-08-30 ASSESSMENT — PAIN SCALES - GENERAL
PAINLEVEL: 0
PAINLEVEL: 0

## (undated) NOTE — LETTER
Gerhardt Lunch, [facility]  [address]  [address]  [address] [facility], [address] [facility]       12/20/17        Patient: Verenice Mathew   YOB: 1955   Date of Visit: 12/20/2017       Dear  Ayaka Hinkle      Thank you for referring Verenice Mathew to my practic Negative for kidney or bladder stones. No kidney infections with high fever or flank pain. No history of bladder, prostate, testicular, epididymal infections. No history of trauma or injury to the urinary tract or genitalia.  No history of tumors or can 8.   There is no history of skin disease. 9.   No history of neurological disease in the form of stroke, multiple sclerosis, seizures or depression. 10. No history of endocrine disorders in the form of diabetes or thyroid disease. 11.  No history of ca evidence of bladder distention. Gu exam shows normal external genitalia and hair distribution including penis which is fully developed, circumcised. Glans and meatus are normal.  Penile shaft skin is normal.  There are no masses or plaques.   Scrotum is biopsies. I discussed being off all blood thinners for 10 days, antibiotic prophylaxis the day before, the day of and the day after. The day of the procedure to sit as it puts pressure on the prostate to help  control bleeding.   I discussed blood in the

## (undated) NOTE — LETTER
July 11, 2022      Sherley Salgado  [address] [facility] [address]      Dear Floyd Estrada:    [address] [facility] office has been trying to contact you. We received a message from Jocy,  with [facility] on 6/23/22 to assist in scheduling a follow up after your Emergency room visit. Please give our office a call if you would like to schedule a follow up visit. As always, thank you for selecting [facility] for your healthcare needs.     Sincerely,    Your Physician & Nursing Staff

## (undated) NOTE — LETTER
July 11, 2022      Filiberto Daniel  [address] [address]      Dear Venita Dasilva:    [address] [facility] office has been trying to contact you. We received a message from Jocy,  with [facility] on 6/23/22 to assist in scheduling a follow up after your Emergency room visit. Please give our office a call      As always, thank you for selecting [facility] for your healthcare needs.     Sincerely,    Your Physician & Nursing Staff

## (undated) NOTE — LETTER
May 27, 2020         Elaine Cottrell MD  [address] [facility]      Patient: Ceci Trevino   YOB: 1955   Date of Visit: 5/27/2020       Dear Dr. Reji Jean-Baptiste MD,    I saw your patient, Ceci Trevino, on 5/27/2020.  Enclosed is my

## (undated) NOTE — LETTER
Christiano Salgado, [facility]  [address]  [address]  [address] [facility] [place] [address]       12/20/17        Patient: Consuello Harada   YOB: 1955   Date of Visit: 12/20/2017       Dear  Dr. Veronica Sawyer      Thank you for referring Consuello Harada to my practi Negative for kidney or bladder stones. No kidney infections with high fever or flank pain. No history of bladder, prostate, testicular, epididymal infections. No history of trauma or injury to the urinary tract or genitalia.  No history of tumors or can 8.   There is no history of skin disease. 9.   No history of neurological disease in the form of stroke, multiple sclerosis, seizures or depression. 10. No history of endocrine disorders in the form of diabetes or thyroid disease. 11.  No history of ca evidence of bladder distention. Gu exam shows normal external genitalia and hair distribution including penis which is fully developed, circumcised. Glans and meatus are normal.  Penile shaft skin is normal.  There are no masses or plaques.   Scrotum is biopsies. I discussed being off all blood thinners for 10 days, antibiotic prophylaxis the day before, the day of and the day after. The day of the procedure to sit as it puts pressure on the prostate to help  control bleeding.   I discussed blood in the

## (undated) NOTE — MR AVS SNAPSHOT
[address], [address]  [address] [facility]  [phone]               Thank you for choosing us for your health care visit with An Costa.  MD Bennie.  We are glad to serve you and happy to provide you with this summary Enter your [employer] Activation Code exactly as it appears below along with your Zip Code and Date of Birth to complete the sign-up process. If you do not sign up before the expiration date, you must request a new code.     Your unique 500px Access Code: VN Visit [facility] online at  [email or web address]

## (undated) NOTE — LETTER
02/28/19        [address] [facility]      Dear Lyn Morejon records indicate that you have outstanding lab work and or testing that was ordered for you and has not yet been completed:  Orders Placed This Encounter      PSA    To

## (undated) NOTE — ED AVS SNAPSHOT
Modesto Gan   MRN: [id number]    Department:  [facility] Emergency Department   Date of Visit:  6/19/2019           Disclosure     Insurance plans vary and the physician(s) referred by the ER may not be covered by your plan.  Please contact yo CARE PHYSICIAN AT ONCE OR RETURN IMMEDIATELY TO THE EMERGENCY DEPARTMENT. If you have been prescribed any medication(s), please fill your prescription right away and begin taking the medication(s) as directed.   If you believe that any of the medications

## (undated) NOTE — ED AVS SNAPSHOT
Ellie Aguilar   MRN: [id number]    Department:  [facility] Emergency Department   Date of Visit:  6/17/2019           Disclosure     Insurance plans vary and the physician(s) referred by the ER may not be covered by your plan.  Please contact yo CARE PHYSICIAN AT ONCE OR RETURN IMMEDIATELY TO THE EMERGENCY DEPARTMENT. If you have been prescribed any medication(s), please fill your prescription right away and begin taking the medication(s) as directed.   If you believe that any of the medications

## (undated) NOTE — LETTER
Magaly Langford, [facility]  [address]  [address]  [address] [place], [place] [address]       12/20/17        Patient: Namita Jolly   YOB: 1955   Date of Visit: 12/20/2017       Dear  Dr. Stevie Good      Thank you for referring Namita Jolly to my practi Negative for kidney or bladder stones. No kidney infections with high fever or flank pain. No history of bladder, prostate, testicular, epididymal infections. No history of trauma or injury to the urinary tract or genitalia.  No history of tumors or can 8.   There is no history of skin disease. 9.   No history of neurological disease in the form of stroke, multiple sclerosis, seizures or depression. 10. No history of endocrine disorders in the form of diabetes or thyroid disease. 11.  No history of ca evidence of bladder distention. Gu exam shows normal external genitalia and hair distribution including penis which is fully developed, circumcised. Glans and meatus are normal.  Penile shaft skin is normal.  There are no masses or plaques.   Scrotum is biopsies. I discussed being off all blood thinners for 10 days, antibiotic prophylaxis the day before, the day of and the day after. The day of the procedure to sit as it puts pressure on the prostate to help  control bleeding.   I discussed blood in the

## (undated) NOTE — Clinical Note
Staff. Patient was referred to the [facility] prostate center for seed implants. He has medicaid replacement. A prior Kirsten Staton is probably required for the referral. Please address. Thank you!

## (undated) NOTE — LETTER
Patient: Mahamed Currie   YOB: 1955   Date of Visit: 1/11/2021     Dear  Dr. Ji Al PA-C,    Thank you for referring Mahamed Currie to my practice. Please find my assessment and plan below.       Right inguinal hernia  (primary encounter diagno